# Patient Record
Sex: FEMALE | Race: WHITE | NOT HISPANIC OR LATINO | Employment: FULL TIME | URBAN - METROPOLITAN AREA
[De-identification: names, ages, dates, MRNs, and addresses within clinical notes are randomized per-mention and may not be internally consistent; named-entity substitution may affect disease eponyms.]

---

## 2018-01-04 ENCOUNTER — OFFICE VISIT (OUTPATIENT)
Dept: URGENT CARE | Facility: CLINIC | Age: 63
End: 2018-01-04
Payer: COMMERCIAL

## 2018-01-04 VITALS
RESPIRATION RATE: 16 BRPM | TEMPERATURE: 97 F | OXYGEN SATURATION: 97 % | SYSTOLIC BLOOD PRESSURE: 156 MMHG | BODY MASS INDEX: 25.86 KG/M2 | HEART RATE: 95 BPM | WEIGHT: 137 LBS | DIASTOLIC BLOOD PRESSURE: 79 MMHG | HEIGHT: 61 IN

## 2018-01-04 DIAGNOSIS — R05.9 COUGH: ICD-10-CM

## 2018-01-04 DIAGNOSIS — J06.9 URI, ACUTE: Primary | ICD-10-CM

## 2018-01-04 LAB
CTP QC/QA: YES
FLUAV AG NPH QL: NEGATIVE
FLUBV AG NPH QL: NEGATIVE

## 2018-01-04 PROCEDURE — 99203 OFFICE O/P NEW LOW 30 MIN: CPT | Mod: S$GLB,,, | Performed by: PHYSICIAN ASSISTANT

## 2018-01-04 PROCEDURE — 87804 INFLUENZA ASSAY W/OPTIC: CPT | Mod: QW,S$GLB,, | Performed by: PHYSICIAN ASSISTANT

## 2018-01-04 RX ORDER — FERROUS SULFATE, DRIED 160(50) MG
1 TABLET, EXTENDED RELEASE ORAL 2 TIMES DAILY WITH MEALS
COMMUNITY
End: 2019-02-14

## 2018-01-04 RX ORDER — EZETIMIBE 10 MG/1
10 TABLET ORAL DAILY
COMMUNITY
End: 2019-02-14

## 2018-01-04 RX ORDER — FELODIPINE 10 MG/1
10 TABLET, EXTENDED RELEASE ORAL DAILY
COMMUNITY
End: 2019-02-14

## 2018-01-04 RX ORDER — TRAMADOL HYDROCHLORIDE 50 MG/1
50 TABLET ORAL EVERY 6 HOURS PRN
COMMUNITY

## 2018-01-04 RX ORDER — FELBAMATE 400 MG/1
400 TABLET ORAL EVERY 6 HOURS
COMMUNITY
End: 2019-02-14

## 2018-01-04 RX ORDER — FEXOFENADINE HCL AND PSEUDOEPHEDRINE HCI 60; 120 MG/1; MG/1
1 TABLET, EXTENDED RELEASE ORAL 2 TIMES DAILY
Qty: 20 TABLET | Refills: 0 | Status: SHIPPED | OUTPATIENT
Start: 2018-01-04 | End: 2018-01-14

## 2018-01-04 RX ORDER — DIPHENOXYLATE HYDROCHLORIDE AND ATROPINE SULFATE 2.5; .025 MG/1; MG/1
1 TABLET ORAL 4 TIMES DAILY PRN
COMMUNITY
End: 2019-02-14

## 2018-01-04 RX ORDER — INSULIN GLARGINE 100 [IU]/ML
INJECTION, SOLUTION SUBCUTANEOUS NIGHTLY
COMMUNITY
End: 2019-02-14

## 2018-01-04 RX ORDER — GLIPIZIDE 10 MG/1
20 TABLET, FILM COATED, EXTENDED RELEASE ORAL
COMMUNITY

## 2018-01-04 RX ORDER — ONDANSETRON HYDROCHLORIDE 4 MG/5ML
8 SOLUTION ORAL 2 TIMES DAILY
COMMUNITY
End: 2019-02-14

## 2018-01-04 RX ORDER — PANTOPRAZOLE SODIUM 20 MG/1
20 TABLET, DELAYED RELEASE ORAL DAILY
COMMUNITY

## 2018-01-04 RX ORDER — GLUCOSAMINE/CHONDR SU A SOD 167-133 MG
2000 CAPSULE ORAL NIGHTLY
COMMUNITY
End: 2019-02-14

## 2018-01-05 NOTE — PROGRESS NOTES
"Subjective:       Patient ID: Karen Leone is a 62 y.o. female.    Vitals:  height is 5' 1" (1.549 m) and weight is 62.1 kg (137 lb). Her temperature is 96.7 °F (35.9 °C). Her blood pressure is 156/79 (abnormal) and her pulse is 95. Her respiration is 16 and oxygen saturation is 97%.     Chief Complaint: Cough    Pt presents with cough, congestion x 3 days. She reports no fevers or chills. She reports nausea on the 1st day of sxs, but that resolved. She has been in contact with others who have had the flu. MB      Cough   This is a new problem. Episode onset: 4d. The problem has been gradually worsening. The cough is non-productive. Associated symptoms include headaches, nasal congestion and a sore throat. Pertinent negatives include no chest pain, chills, ear pain, eye redness, fever, myalgias, shortness of breath or wheezing. Nothing aggravates the symptoms. She has tried OTC cough suppressant for the symptoms. The treatment provided mild relief.     Review of Systems   Constitution: Positive for decreased appetite. Negative for chills, fever and malaise/fatigue.   HENT: Positive for congestion and sore throat. Negative for ear pain and hoarse voice.    Eyes: Negative for discharge and redness.   Cardiovascular: Negative for chest pain, dyspnea on exertion and leg swelling.   Respiratory: Positive for cough. Negative for shortness of breath, sputum production and wheezing.    Musculoskeletal: Negative for myalgias.   Gastrointestinal: Positive for nausea. Negative for abdominal pain.   Neurological: Positive for headaches.       Objective:      Physical Exam   Constitutional: She is oriented to person, place, and time. She appears well-developed and well-nourished. She is cooperative.  Non-toxic appearance. She does not appear ill. No distress.   HENT:   Head: Normocephalic and atraumatic.   Right Ear: Hearing, tympanic membrane, external ear and ear canal normal.   Left Ear: Hearing, tympanic membrane, external " ear and ear canal normal.   Nose: Mucosal edema present. No rhinorrhea or nasal deformity. No epistaxis. Right sinus exhibits no maxillary sinus tenderness and no frontal sinus tenderness. Left sinus exhibits no maxillary sinus tenderness and no frontal sinus tenderness.   Mouth/Throat: Uvula is midline, oropharynx is clear and moist and mucous membranes are normal. No trismus in the jaw. Normal dentition. No uvula swelling. No posterior oropharyngeal erythema.   Eyes: Conjunctivae and lids are normal. Right eye exhibits no discharge. Left eye exhibits no discharge. No scleral icterus.   Sclera clear bilat   Neck: Trachea normal, normal range of motion, full passive range of motion without pain and phonation normal. Neck supple.   Cardiovascular: Normal rate, regular rhythm, normal heart sounds, intact distal pulses and normal pulses.    Pulmonary/Chest: Effort normal and breath sounds normal. No respiratory distress.   Abdominal: Soft. Normal appearance and bowel sounds are normal. She exhibits no distension, no pulsatile midline mass and no mass. There is no tenderness.   Musculoskeletal: Normal range of motion. She exhibits no edema or deformity.   Neurological: She is alert and oriented to person, place, and time. She exhibits normal muscle tone. Coordination normal.   Skin: Skin is warm, dry and intact. She is not diaphoretic. No pallor.   Psychiatric: She has a normal mood and affect. Her speech is normal and behavior is normal. Judgment and thought content normal. Cognition and memory are normal.   Nursing note and vitals reviewed.      Assessment:       1. URI, acute    2. Cough        Plan:         URI, acute  -     benzocaine-menthol 15-2.6 mg Lozg; 1 lozenge by Mucous Membrane route as needed (sore throat).  Dispense: 18 lozenge; Refill: 0  -     fexofenadine-pseudoephedrine  mg (ALLEGRA-D 12 HOUR)  mg per tablet; Take 1 tablet by mouth 2 (two) times daily.  Dispense: 20 tablet; Refill:  0    Cough  -     POCT Influenza A/B    Please drink plenty of fluids.  Please get plenty of rest.  Please return here or go to the Emergency Department for any concerns or worsening of condition.  If you were prescribed antibiotics, please take them to completion.  If you were given wait & see antibiotics, please wait 5-7 days before taking them, and only take them if your symptoms have worsened or not improved.  If you do begin taking the antibiotics, please take them to completion.  If you were prescribed a narcotic medication, do not drive or operate heavy equipment or machinery while taking these medications.  If you were given a steroid shot in the clinic and have also been given a prescription for a steroid such as Prednisone or a Medrol Dose Pack, please begin taking them tomorrow.  If you do not have Hypertension or any history of palpitations, it is ok to take over the counter Sudafed or Mucinex D or Allegra-D or Claritin-D or Zyrtec-D.  If you do take one of the above, it is ok to combine that with plain over the counter Mucinex or Allegra or Claritin or Zyrtec.  If for example you are taking Zyrtec -D, you can combine that with Mucinex, but not Mucinex-D.  If you are taking Mucinex-D, you can combine that with plain Allegra or Claritin or Zyrtec.   If you do have Hypertension or palpitations, it is safe to take Coricidin HBP for relief of sinus symptoms.  If not allergic, please take over the counter Tylenol (Acetaminophen) and/or Motrin (Ibuprofen) as directed for control of pain and/or fever.  Please follow up with your primary care doctor or specialist as needed.    If you  smoke, please stop smoking.

## 2019-02-14 ENCOUNTER — HOSPITAL ENCOUNTER (OUTPATIENT)
Facility: HOSPITAL | Age: 64
Discharge: HOME | End: 2019-02-15
Attending: EMERGENCY MEDICINE | Admitting: INTERNAL MEDICINE
Payer: MEDICARE
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
## 2019-02-14 DIAGNOSIS — R55 NEAR SYNCOPE: ICD-10-CM

## 2019-02-14 DIAGNOSIS — R53.1 WEAKNESS: Primary | ICD-10-CM

## 2019-02-14 DIAGNOSIS — I16.0 HYPERTENSIVE URGENCY: ICD-10-CM

## 2019-02-14 DIAGNOSIS — R55 PRE-SYNCOPE: ICD-10-CM

## 2019-02-14 DIAGNOSIS — R79.89 ELEVATED TROPONIN: ICD-10-CM

## 2019-02-14 PROBLEM — I10 ESSENTIAL HYPERTENSION: Status: ACTIVE | Noted: 2019-02-14

## 2019-02-14 PROBLEM — E03.9 HYPOTHYROID: Status: ACTIVE | Noted: 2019-02-14

## 2019-02-14 PROBLEM — Z79.4 TYPE 2 DIABETES MELLITUS WITHOUT COMPLICATION, WITH LONG-TERM CURRENT USE OF INSULIN: Status: ACTIVE | Noted: 2019-02-14

## 2019-02-14 PROBLEM — E78.5 HLD (HYPERLIPIDEMIA): Status: ACTIVE | Noted: 2019-02-14

## 2019-02-14 PROBLEM — E11.9 TYPE 2 DIABETES MELLITUS WITHOUT COMPLICATION, WITH LONG-TERM CURRENT USE OF INSULIN: Status: ACTIVE | Noted: 2019-02-14

## 2019-02-14 LAB
25(OH)D3+25(OH)D2 SERPL-MCNC: 38 NG/ML
ALBUMIN SERPL BCP-MCNC: 3.4 G/DL
ALP SERPL-CCNC: 53 U/L
ALT SERPL W/O P-5'-P-CCNC: 13 U/L
AMPHET+METHAMPHET UR QL: NEGATIVE
ANION GAP SERPL CALC-SCNC: 15 MMOL/L
AORTIC ROOT ANNULUS: 2.37 CM
AORTIC VALVE CUSP SEPERATION: 1.37 CM
AST SERPL-CCNC: 24 U/L
AV INDEX (PROSTH): 0.97
AV MEAN GRADIENT: 1.98 MMHG
AV PEAK GRADIENT: 4 MMHG
AV VALVE AREA: 2.51 CM2
AV VELOCITY RATIO: 1.03
BACTERIA #/AREA URNS HPF: ABNORMAL /HPF
BARBITURATES UR QL SCN>200 NG/ML: NEGATIVE
BASOPHILS # BLD AUTO: 0.02 K/UL
BASOPHILS NFR BLD: 0.2 %
BENZODIAZ UR QL SCN>200 NG/ML: NEGATIVE
BILIRUB SERPL-MCNC: 0.4 MG/DL
BILIRUB UR QL STRIP: NEGATIVE
BNP SERPL-MCNC: 150 PG/ML
BSA FOR ECHO PROCEDURE: 1.56 M2
BUN SERPL-MCNC: 32 MG/DL
BZE UR QL SCN: NEGATIVE
CALCIUM SERPL-MCNC: 8.4 MG/DL
CANNABINOIDS UR QL SCN: NEGATIVE
CHLORIDE SERPL-SCNC: 98 MMOL/L
CHOLEST SERPL-MCNC: 140 MG/DL
CHOLEST/HDLC SERPL: 2.3 {RATIO}
CK SERPL-CCNC: 138 U/L
CLARITY UR: CLEAR
CO2 SERPL-SCNC: 26 MMOL/L
COLOR UR: YELLOW
CREAT SERPL-MCNC: 1.3 MG/DL
CREAT UR-MCNC: 69.3 MG/DL
CV ECHO LV RWT: 0.39 CM
DIFFERENTIAL METHOD: ABNORMAL
DOP CALC AO PEAK VEL: 1 M/S
DOP CALC AO VTI: 22.97 CM
DOP CALC LVOT AREA: 2.6 CM2
DOP CALC LVOT DIAMETER: 1.82 CM
DOP CALC LVOT PEAK VEL: 1.03 M/S
DOP CALC LVOT STROKE VOLUME: 57.73 CM3
DOP CALCLVOT PEAK VEL VTI: 22.2 CM
E WAVE DECELERATION TIME: 168.97 MSEC
E/A RATIO: 1.15
ECHO LV POSTERIOR WALL: 0.77 CM (ref 0.6–1.1)
EOSINOPHIL # BLD AUTO: 0.1 K/UL
EOSINOPHIL NFR BLD: 0.6 %
ERYTHROCYTE [DISTWIDTH] IN BLOOD BY AUTOMATED COUNT: 14.4 %
EST. GFR  (AFRICAN AMERICAN): 50 ML/MIN/1.73 M^2
EST. GFR  (NON AFRICAN AMERICAN): 43 ML/MIN/1.73 M^2
ESTIMATED AVG GLUCOSE: 163 MG/DL
FRACTIONAL SHORTENING: 37 % (ref 28–44)
GLUCOSE SERPL-MCNC: 81 MG/DL
GLUCOSE UR QL STRIP: NEGATIVE
HBA1C MFR BLD HPLC: 7.3 %
HCT VFR BLD AUTO: 37.9 %
HDLC SERPL-MCNC: 61 MG/DL
HDLC SERPL: 43.6 %
HGB BLD-MCNC: 12.1 G/DL
HGB UR QL STRIP: NEGATIVE
INTERVENTRICULAR SEPTUM: 1.05 CM (ref 0.6–1.1)
IVRT: 0.09 MSEC
KETONES UR QL STRIP: NEGATIVE
LA MAJOR: 4.47 CM
LA MINOR: 4.39 CM
LA WIDTH: 3.01 CM
LACTATE SERPL-SCNC: 1.7 MMOL/L
LDLC SERPL CALC-MCNC: 49.8 MG/DL
LEFT ATRIUM SIZE: 3.49 CM
LEFT ATRIUM VOLUME INDEX: 25.7 ML/M2
LEFT ATRIUM VOLUME: 39.55 CM3
LEFT INTERNAL DIMENSION IN SYSTOLE: 2.52 CM (ref 2.1–4)
LEFT VENTRICLE DIASTOLIC VOLUME INDEX: 45.32 ML/M2
LEFT VENTRICLE DIASTOLIC VOLUME: 69.75 ML
LEFT VENTRICLE MASS INDEX: 72.1 G/M2
LEFT VENTRICLE SYSTOLIC VOLUME INDEX: 14.7 ML/M2
LEFT VENTRICLE SYSTOLIC VOLUME: 22.66 ML
LEFT VENTRICULAR INTERNAL DIMENSION IN DIASTOLE: 3.99 CM (ref 3.5–6)
LEFT VENTRICULAR MASS: 110.92 G
LEUKOCYTE ESTERASE UR QL STRIP: NEGATIVE
LV LATERAL E/E' RATIO: 13
LYMPHOCYTES # BLD AUTO: 1.9 K/UL
LYMPHOCYTES NFR BLD: 23.5 %
MCH RBC QN AUTO: 27.8 PG
MCHC RBC AUTO-ENTMCNC: 31.9 G/DL
MCV RBC AUTO: 87 FL
METHADONE UR QL SCN>300 NG/ML: NEGATIVE
MICROSCOPIC COMMENT: ABNORMAL
MONOCYTES # BLD AUTO: 0.8 K/UL
MONOCYTES NFR BLD: 9.3 %
MV PEAK A VEL: 0.79 M/S
MV PEAK E VEL: 0.91 M/S
NEUTROPHILS # BLD AUTO: 5.4 K/UL
NEUTROPHILS NFR BLD: 66.2 %
NITRITE UR QL STRIP: POSITIVE
NONHDLC SERPL-MCNC: 79 MG/DL
OPIATES UR QL SCN: NEGATIVE
PCP UR QL SCN>25 NG/ML: NEGATIVE
PH UR STRIP: 6 [PH] (ref 5–8)
PLATELET # BLD AUTO: 310 K/UL
PMV BLD AUTO: 9.8 FL
POCT GLUCOSE: 142 MG/DL (ref 70–110)
POTASSIUM SERPL-SCNC: 3.3 MMOL/L
PROT SERPL-MCNC: 7 G/DL
PROT UR QL STRIP: NEGATIVE
PULM VEIN S/D RATIO: 1.18
PV PEAK D VEL: 0.49 M/S
PV PEAK S VEL: 0.58 M/S
PV PEAK VELOCITY: 0.81 CM/S
RA MAJOR: 4.03 CM
RA PRESSURE: 3 MMHG
RBC # BLD AUTO: 4.36 M/UL
RBC #/AREA URNS HPF: 1 /HPF (ref 0–4)
RIGHT VENTRICULAR END-DIASTOLIC DIMENSION: 2.34 CM
SODIUM SERPL-SCNC: 139 MMOL/L
SP GR UR STRIP: 1.01 (ref 1–1.03)
T4 FREE SERPL-MCNC: 1.33 NG/DL
TDI LATERAL: 0.07
TOXICOLOGY INFORMATION: NORMAL
TRIGL SERPL-MCNC: 146 MG/DL
TROPONIN I SERPL DL<=0.01 NG/ML-MCNC: 0.05 NG/ML
TROPONIN I SERPL DL<=0.01 NG/ML-MCNC: 0.09 NG/ML
URN SPEC COLLECT METH UR: ABNORMAL
UROBILINOGEN UR STRIP-ACNC: NEGATIVE EU/DL
WBC # BLD AUTO: 8.18 K/UL
WBC #/AREA URNS HPF: 3 /HPF (ref 0–5)

## 2019-02-14 PROCEDURE — 84484 ASSAY OF TROPONIN QUANT: CPT | Mod: 91

## 2019-02-14 PROCEDURE — 94761 N-INVAS EAR/PLS OXIMETRY MLT: CPT

## 2019-02-14 PROCEDURE — 63600175 PHARM REV CODE 636 W HCPCS: Performed by: STUDENT IN AN ORGANIZED HEALTH CARE EDUCATION/TRAINING PROGRAM

## 2019-02-14 PROCEDURE — 96372 THER/PROPH/DIAG INJ SC/IM: CPT | Mod: 59

## 2019-02-14 PROCEDURE — 80061 LIPID PANEL: CPT

## 2019-02-14 PROCEDURE — 93010 ELECTROCARDIOGRAM REPORT: CPT | Mod: 76,,, | Performed by: STUDENT IN AN ORGANIZED HEALTH CARE EDUCATION/TRAINING PROGRAM

## 2019-02-14 PROCEDURE — G0378 HOSPITAL OBSERVATION PER HR: HCPCS

## 2019-02-14 PROCEDURE — 80053 COMPREHEN METABOLIC PANEL: CPT

## 2019-02-14 PROCEDURE — 85025 COMPLETE CBC W/AUTO DIFF WBC: CPT

## 2019-02-14 PROCEDURE — 83880 ASSAY OF NATRIURETIC PEPTIDE: CPT

## 2019-02-14 PROCEDURE — 25000003 PHARM REV CODE 250: Performed by: STUDENT IN AN ORGANIZED HEALTH CARE EDUCATION/TRAINING PROGRAM

## 2019-02-14 PROCEDURE — 83605 ASSAY OF LACTIC ACID: CPT

## 2019-02-14 PROCEDURE — 82306 VITAMIN D 25 HYDROXY: CPT

## 2019-02-14 PROCEDURE — 84439 ASSAY OF FREE THYROXINE: CPT

## 2019-02-14 PROCEDURE — 93010 EKG 12-LEAD: ICD-10-PCS | Mod: ,,, | Performed by: STUDENT IN AN ORGANIZED HEALTH CARE EDUCATION/TRAINING PROGRAM

## 2019-02-14 PROCEDURE — 93010 ELECTROCARDIOGRAM REPORT: CPT | Mod: ,,, | Performed by: STUDENT IN AN ORGANIZED HEALTH CARE EDUCATION/TRAINING PROGRAM

## 2019-02-14 PROCEDURE — 93005 ELECTROCARDIOGRAM TRACING: CPT

## 2019-02-14 PROCEDURE — 81000 URINALYSIS NONAUTO W/SCOPE: CPT | Mod: 59

## 2019-02-14 PROCEDURE — 25500020 PHARM REV CODE 255: Performed by: INTERNAL MEDICINE

## 2019-02-14 PROCEDURE — 84484 ASSAY OF TROPONIN QUANT: CPT

## 2019-02-14 PROCEDURE — S5571 INSULIN DISPOS PEN 3 ML: HCPCS | Performed by: STUDENT IN AN ORGANIZED HEALTH CARE EDUCATION/TRAINING PROGRAM

## 2019-02-14 PROCEDURE — 83036 HEMOGLOBIN GLYCOSYLATED A1C: CPT

## 2019-02-14 PROCEDURE — 80307 DRUG TEST PRSMV CHEM ANLYZR: CPT

## 2019-02-14 PROCEDURE — 82550 ASSAY OF CK (CPK): CPT

## 2019-02-14 PROCEDURE — 36415 COLL VENOUS BLD VENIPUNCTURE: CPT

## 2019-02-14 PROCEDURE — 99285 EMERGENCY DEPT VISIT HI MDM: CPT | Mod: 25

## 2019-02-14 RX ORDER — FENOFIBRATE 160 MG/1
160 TABLET ORAL DAILY
COMMUNITY

## 2019-02-14 RX ORDER — IBUPROFEN 200 MG
16 TABLET ORAL
Status: DISCONTINUED | OUTPATIENT
Start: 2019-02-14 | End: 2019-02-15 | Stop reason: HOSPADM

## 2019-02-14 RX ORDER — CETIRIZINE HYDROCHLORIDE 5 MG/1
5 TABLET ORAL DAILY
Status: DISCONTINUED | OUTPATIENT
Start: 2019-02-15 | End: 2019-02-15 | Stop reason: HOSPADM

## 2019-02-14 RX ORDER — PANTOPRAZOLE SODIUM 20 MG/1
20 TABLET, DELAYED RELEASE ORAL DAILY
Status: DISCONTINUED | OUTPATIENT
Start: 2019-02-15 | End: 2019-02-15 | Stop reason: HOSPADM

## 2019-02-14 RX ORDER — HYDROCHLOROTHIAZIDE 25 MG/1
25 TABLET ORAL ONCE
Status: COMPLETED | OUTPATIENT
Start: 2019-02-14 | End: 2019-02-14

## 2019-02-14 RX ORDER — IBUPROFEN 200 MG
24 TABLET ORAL
Status: DISCONTINUED | OUTPATIENT
Start: 2019-02-14 | End: 2019-02-15 | Stop reason: HOSPADM

## 2019-02-14 RX ORDER — FENOFIBRATE 160 MG/1
160 TABLET ORAL DAILY
Status: DISCONTINUED | OUTPATIENT
Start: 2019-02-15 | End: 2019-02-15 | Stop reason: HOSPADM

## 2019-02-14 RX ORDER — POTASSIUM CHLORIDE 20 MEQ/1
40 TABLET, EXTENDED RELEASE ORAL ONCE
Status: COMPLETED | OUTPATIENT
Start: 2019-02-14 | End: 2019-02-14

## 2019-02-14 RX ORDER — LISINOPRIL 20 MG/1
40 TABLET ORAL DAILY
Status: DISCONTINUED | OUTPATIENT
Start: 2019-02-15 | End: 2019-02-15 | Stop reason: HOSPADM

## 2019-02-14 RX ORDER — SODIUM CHLORIDE 0.9 % (FLUSH) 0.9 %
5 SYRINGE (ML) INJECTION
Status: DISCONTINUED | OUTPATIENT
Start: 2019-02-14 | End: 2019-02-15 | Stop reason: HOSPADM

## 2019-02-14 RX ORDER — ERGOCALCIFEROL 1.25 MG/1
50000 CAPSULE ORAL
COMMUNITY

## 2019-02-14 RX ORDER — INSULIN ASPART 100 [IU]/ML
1-10 INJECTION, SOLUTION INTRAVENOUS; SUBCUTANEOUS
Status: DISCONTINUED | OUTPATIENT
Start: 2019-02-14 | End: 2019-02-15 | Stop reason: HOSPADM

## 2019-02-14 RX ORDER — LISINOPRIL 40 MG/1
60 TABLET ORAL DAILY
Status: ON HOLD | COMMUNITY
End: 2019-02-15 | Stop reason: SDUPTHER

## 2019-02-14 RX ORDER — GLUCAGON 1 MG
1 KIT INJECTION
Status: DISCONTINUED | OUTPATIENT
Start: 2019-02-14 | End: 2019-02-15 | Stop reason: HOSPADM

## 2019-02-14 RX ORDER — LORATADINE 10 MG/1
10 TABLET ORAL DAILY
COMMUNITY

## 2019-02-14 RX ORDER — DIPHENOXYLATE HYDROCHLORIDE AND ATROPINE SULFATE 2.5; .025 MG/1; MG/1
1 TABLET ORAL 3 TIMES DAILY PRN
Status: DISCONTINUED | OUTPATIENT
Start: 2019-02-14 | End: 2019-02-15 | Stop reason: HOSPADM

## 2019-02-14 RX ORDER — LEVOTHYROXINE SODIUM 50 UG/1
50 TABLET ORAL
Status: DISCONTINUED | OUTPATIENT
Start: 2019-02-15 | End: 2019-02-15 | Stop reason: HOSPADM

## 2019-02-14 RX ORDER — AMLODIPINE BESYLATE 5 MG/1
5 TABLET ORAL ONCE
Status: COMPLETED | OUTPATIENT
Start: 2019-02-14 | End: 2019-02-14

## 2019-02-14 RX ORDER — LEVOTHYROXINE SODIUM 50 UG/1
50 TABLET ORAL DAILY
COMMUNITY

## 2019-02-14 RX ORDER — ATORVASTATIN CALCIUM 40 MG/1
80 TABLET, FILM COATED ORAL NIGHTLY
Status: DISCONTINUED | OUTPATIENT
Start: 2019-02-14 | End: 2019-02-15 | Stop reason: HOSPADM

## 2019-02-14 RX ORDER — DIPHENOXYLATE HYDROCHLORIDE AND ATROPINE SULFATE 2.5; .025 MG/1; MG/1
1 TABLET ORAL 3 TIMES DAILY
Status: DISCONTINUED | OUTPATIENT
Start: 2019-02-14 | End: 2019-02-14

## 2019-02-14 RX ORDER — AMLODIPINE BESYLATE 5 MG/1
10 TABLET ORAL DAILY
Status: DISCONTINUED | OUTPATIENT
Start: 2019-02-15 | End: 2019-02-15 | Stop reason: HOSPADM

## 2019-02-14 RX ORDER — METOPROLOL SUCCINATE 50 MG/1
100 TABLET, EXTENDED RELEASE ORAL DAILY
COMMUNITY

## 2019-02-14 RX ORDER — AMLODIPINE BESYLATE 5 MG/1
10 TABLET ORAL DAILY
COMMUNITY

## 2019-02-14 RX ORDER — INSULIN GLARGINE 100 [IU]/ML
28 INJECTION, SOLUTION SUBCUTANEOUS NIGHTLY
COMMUNITY

## 2019-02-14 RX ORDER — ATORVASTATIN CALCIUM 80 MG/1
80 TABLET, FILM COATED ORAL DAILY
COMMUNITY

## 2019-02-14 RX ORDER — METOPROLOL SUCCINATE 50 MG/1
50 TABLET, EXTENDED RELEASE ORAL DAILY
Status: DISCONTINUED | OUTPATIENT
Start: 2019-02-15 | End: 2019-02-15 | Stop reason: HOSPADM

## 2019-02-14 RX ORDER — LEVOTHYROXINE SODIUM 50 UG/1
50 TABLET ORAL DAILY
Status: DISCONTINUED | OUTPATIENT
Start: 2019-02-14 | End: 2019-02-14

## 2019-02-14 RX ORDER — DIPHENOXYLATE HYDROCHLORIDE AND ATROPINE SULFATE 2.5; .025 MG/1; MG/1
1 TABLET ORAL 3 TIMES DAILY
COMMUNITY

## 2019-02-14 RX ORDER — ACETAMINOPHEN 325 MG/1
650 TABLET ORAL EVERY 4 HOURS PRN
Status: DISCONTINUED | OUTPATIENT
Start: 2019-02-14 | End: 2019-02-15 | Stop reason: HOSPADM

## 2019-02-14 RX ORDER — HEPARIN SODIUM 5000 [USP'U]/ML
5000 INJECTION, SOLUTION INTRAVENOUS; SUBCUTANEOUS EVERY 12 HOURS
Status: DISCONTINUED | OUTPATIENT
Start: 2019-02-14 | End: 2019-02-15 | Stop reason: HOSPADM

## 2019-02-14 RX ORDER — TRAMADOL HYDROCHLORIDE 50 MG/1
50 TABLET ORAL EVERY 6 HOURS PRN
Status: DISCONTINUED | OUTPATIENT
Start: 2019-02-14 | End: 2019-02-15 | Stop reason: HOSPADM

## 2019-02-14 RX ORDER — HYDROCHLOROTHIAZIDE 25 MG/1
25 TABLET ORAL DAILY
Status: DISCONTINUED | OUTPATIENT
Start: 2019-02-15 | End: 2019-02-15 | Stop reason: HOSPADM

## 2019-02-14 RX ADMIN — INSULIN DETEMIR 20 UNITS: 100 INJECTION, SOLUTION SUBCUTANEOUS at 09:02

## 2019-02-14 RX ADMIN — AMLODIPINE BESYLATE 5 MG: 5 TABLET ORAL at 03:02

## 2019-02-14 RX ADMIN — POTASSIUM CHLORIDE 40 MEQ: 20 TABLET, EXTENDED RELEASE ORAL at 03:02

## 2019-02-14 RX ADMIN — HYDROCHLOROTHIAZIDE 25 MG: 25 TABLET ORAL at 07:02

## 2019-02-14 RX ADMIN — ATORVASTATIN CALCIUM 80 MG: 40 TABLET, FILM COATED ORAL at 08:02

## 2019-02-14 RX ADMIN — TRAMADOL HYDROCHLORIDE 50 MG: 50 TABLET, FILM COATED ORAL at 08:02

## 2019-02-14 RX ADMIN — HEPARIN SODIUM 5000 UNITS: 5000 INJECTION, SOLUTION INTRAVENOUS; SUBCUTANEOUS at 08:02

## 2019-02-14 RX ADMIN — IOHEXOL 100 ML: 350 INJECTION, SOLUTION INTRAVENOUS at 04:02

## 2019-02-14 NOTE — H&P
"[facility] Medicine H&P Note     Admitting Team: [facility]   Attending Physician: Sal Kilpatrick MD   Resident: Ricarda/Rick  Intern: Anna    Date of Admit: 2/14/2019    Chief Complaint     Fall (Pt reports just before falling her body muscles in her legs and shoulds began to twitch involuntarily causing her to lose control of her legs and fell hitting chin and R ribs and R knee. Currently twitching has resolved. )  x1    Subjective:      History of Present Illness:  Karen Leone is a 63 y.o. female who  has a past medical history of Diabetes mellitus, type 2, Diverticulosis, Hyperlipidemia, and Hypertension.. The patient presented to [facility] on 2/14/2019 with a primary complaint of Fall (Pt reports just before falling her body muscles in her legs and shoulds began to twitch involuntarily causing her to lose control of her legs and fell hitting chin and R ribs and R knee. Currently twitching has resolved. 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 She was in her usual state of health - independent of all ADLs - until the morning of admit. She was cleaning up the OR after a surgery and had the sudden onset of "twitching" and shaking in both of her legs. She felt as though she was frozen, could not move or lift her legs. This lasted for a few seconds until she fell, hitting her chin on the surgical tray and her right rib and knee on the floor. She denies loss of consciousness or confusion. Just prior to this morning's surgery, patient said she had similar feeling in both of her arms that self-resolved within seconds. The patient has never experienced anything like this before today. Denies facial droop, slurred speech, other weakness, fever, chills, chest pain, SOB or palpitations.    In the ED, patient's BP noted to be 220/98. She says she had not taken any of her medications for the day, was planning to after the " procedure. She took them on the way to the ED.     Past Medical History:  HTN  HLD  DM2  Diverticulosis    Past Surgical History:  Tonsillectomy  Eye procedure, bilaterally  L knee tendon repair    Allergies:  Keflex - hives, denies angioedema     Home Medications:  Lisinopril 60 (increased from 40mg 1 month ago)  HCTZ 25  Norvasc 5  Metoprolol XL 50  Basaglar 28U qHS   Glipzide ER 20  Protonix 40  Acyclovir 200mg x5 days qprn  Tramadol 50mg q6h prn   Lamotil 1 tab tid  ofran 4mg bid   Clacium 500mg   Vit D 50K units qweek  Lipitor 80 qHS   Synthroid 50   Fenofibrate 150   Claritin 10mg     Family History:  Mother - diabetes    Social History:  Tobacco - former, 20 pack-year history   EtOH - current, 3 cocktails/night  Drugs - denies    Review of Systems:  Pertinent positives as noted in HPI. All other systems are reviewed and are negative.    Health Maintaince :   Primary Care Physician: Dr. Gonzalez   Immunizations:   TDap: UTD  Flu: UTD  Pna: UTD     Cancer Screening:  PAP: refuses   MMG: not up to date, last   Colonoscopy: not up to date, scheduled for 3/2019      Objective:   Last 24 Hour Vital Signs:  BP  Min: 220/98  Max: 220/98  Temp  Av.2 °F (36.8 °C)  Min: 98.2 °F (36.8 °C)  Max: 98.2 °F (36.8 °C)  Pulse  Av  Min: 73  Max: 73  Resp  Av  Min: 16  Max: 16  SpO2  Av %  Min: 97 %  Max: 97 %  Height  Av' (152.4 cm)  Min: 5' (152.4 cm)  Max: 5' (152.4 cm)  Weight  Av.6 kg (127 lb)  Min: 57.6 kg (127 lb)  Max: 57.6 kg (127 lb)  Body mass index is 24.8 kg/m².  No intake/output data recorded.    Physical Examination:  Physical Exam   Constitutional: She is oriented to person, place, and time and well-developed, well-nourished, and in no distress.   HENT:   Head: Normocephalic and atraumatic.       Nose: Nose normal.   Mouth/Throat: Oropharynx is clear and moist. No oropharyngeal exudate.   Eyes: EOM are normal. Pupils are equal, round, and reactive to light. No scleral icterus.    Neck: Normal range of motion. Neck supple. No JVD present. No thyromegaly present.   Cardiovascular: Normal rate, regular rhythm, normal heart sounds and intact distal pulses. Exam reveals no gallop and no friction rub.   No murmur heard.  Pulmonary/Chest: Effort normal and breath sounds normal. No respiratory distress. She has no wheezes. She has no rales. She exhibits no tenderness.   Abdominal: Soft. Bowel sounds are normal. She exhibits no distension and no mass. There is no tenderness. There is no rebound and no guarding.   Musculoskeletal: Normal range of motion. She exhibits no edema, tenderness or deformity.   Lymphadenopathy:     She has no cervical adenopathy.   Neurological: She is alert and oriented to person, place, and time. She is not disoriented. She displays no weakness, no tremor, facial symmetry and normal speech. No cranial nerve deficit or sensory deficit. She shows no pronator drift. GCS score is 15.   Skin: Skin is warm and dry. No rash noted. She is not diaphoretic. No erythema.       Laboratory:  Most Recent Data:  CBC:   Lab Results   Component Value Date    WBC 8.18 02/14/2019    HGB 12.1 02/14/2019    HCT 37.9 02/14/2019     02/14/2019    MCV 87 02/14/2019    RDW 14.4 02/14/2019   WBC Differential: 66.2% N, 23.5% L, 9.3% M, 0.6% Eo, 0.2% Baso    BMP:   Lab Results   Component Value Date     02/14/2019    K 3.3 (L) 02/14/2019    CL 98 02/14/2019    CO2 26 02/14/2019    BUN 32 (H) 02/14/2019    CREATININE 1.3 02/14/2019    GLU 81 02/14/2019    CALCIUM 8.4 (L) 02/14/2019     LFTs:   Lab Results   Component Value Date    PROT 7.0 02/14/2019    ALBUMIN 3.4 (L) 02/14/2019    BILITOT 0.4 02/14/2019    AST 24 02/14/2019    ALKPHOS 53 (L) 02/14/2019    ALT 13 02/14/2019     Coags: No results found for: INR, PROTIME, PTT  FLP: No results found for: CHOL, HDL, LDLCALC, TRIG, CHOLHDL  DM:   Lab Results   Component Value Date    CREATININE 1.3 02/14/2019     Thyroid: No results found  for: TSH, FREET4, S5ABZCR, C5ZEXJA, THYROIDAB  Anemia: No results found for: IRON, TIBC, FERRITIN, YYECMFFB21, FOLATE  Cardiac:   Lab Results   Component Value Date    TROPONINI 0.051 (H) 02/14/2019     (H) 02/14/2019     Urinalysis:   Lab Results   Component Value Date    COLORU Yellow 02/14/2019    SPECGRAV 1.015 02/14/2019    NITRITE Positive (A) 02/14/2019    KETONESU Negative 02/14/2019    UROBILINOGEN Negative 02/14/2019    WBCUA 3 02/14/2019       Trended Lab Data:  Recent Labs   Lab 02/14/19  1144   WBC 8.18   HGB 12.1   HCT 37.9      MCV 87   RDW 14.4      K 3.3*   CL 98   CO2 26   BUN 32*   CREATININE 1.3   GLU 81   PROT 7.0   ALBUMIN 3.4*   BILITOT 0.4   AST 24   ALKPHOS 53*   ALT 13       Trended Cardiac Data:  Recent Labs   Lab 02/14/19  1144   TROPONINI 0.051*   *       Microbiology Data:  None drawn     Other Results:  EKG (my interpretation):     Radiology:  Imaging Results          CT Maxillofacial Without Contrast (Final result)  Result time 02/14/19 12:43:14    Final result by Rajan Cedeno MD (02/14/19 12:43:14)                 Impression:      Mild subcutaneous edema and probable small hematoma overlying the right anterior mandible.  No facial fractures.      Electronically signed by: Rajan Cedeno MD  Date:    02/14/2019  Time:    12:43             Narrative:    EXAMINATION:  CT MAXILLOFACIAL WITHOUT CONTRAST    CLINICAL HISTORY:  Facial fracture(s);    TECHNIQUE:  1.25 mm axial images were obtained through the maxillofacial region without the use of contrast.  Coronal and sagittal reformats were performed.    COMPARISON:  CT 02/14/2019.    FINDINGS:  No fractures.  No osseous destruction.  There is trace mucosal membrane thickening within the right ethmoid air cells.  Remaining paranasal sinuses and mastoids are clear.  Mandibular condyles are intact.  Degenerative changes of the cervical spine identified, most pronounced at C5-C6.  There is mild subcutaneous  edema with probable small hematoma overlying the right anterior mandible.                               X-Ray Knee 3 View Right (Final result)  Result time 02/14/19 11:32:21    Final result by Fede Becker MD (02/14/19 11:32:21)                 Impression:      No fracture, dislocation or joint effusion.      Electronically signed by: Fede Becker MD  Date:    02/14/2019  Time:    11:32             Narrative:    EXAMINATION:  XR KNEE 3 VIEW RIGHT    CLINICAL HISTORY:  Unspecified fall, initial encounter    TECHNIQUE:  AP, lateral, and Merchant views of the right knee were performed.    COMPARISON:  None    FINDINGS:  Hypertrophic change superior anterior patellar insertion site of extensor tendon.  Early DJD changes intercondylar and spinous process region tibial plateau and lateral patellofemoral joint.                               CT Head Without Contrast (Final result)  Result time 02/14/19 11:06:18    Final result by Rajan Cedeno MD (02/14/19 11:06:18)                 Impression:      1. No acute intracranial CT abnormalities.      Electronically signed by: Rajan Cedeno MD  Date:    02/14/2019  Time:    11:06             Narrative:    EXAMINATION:  CT HEAD WITHOUT CONTRAST    CLINICAL HISTORY:  Confusion/delirium, altered LOC, unexplained;    TECHNIQUE:  5-mm axial images were obtained through the head without the use of contrast.  Coronal and sagittal reformats were performed.    COMPARISON:  None.    FINDINGS:  No CT findings to suggest an acute major vascular distribution infarct.  No intra or extra-axial hemorrhage.  No midline shift or mass effect.  No hydrocephalus.  Sellar region is unremarkable.    There is trace mucosal membrane thickening within the right ethmoid air cells.  Remaining paranasal sinuses and mastoid air cells are clear.  No acute osseous abnormalities.  Subcutaneous soft tissues are normal.                               X-Ray Chest AP Portable (Final result)   "Result time 02/14/19 10:50:16    Final result by Antoni Cheng MD (02/14/19 10:50:16)                 Impression:      No acute abnormality.      Electronically signed by: Antoni Cheng MD  Date:    02/14/2019  Time:    10:50             Narrative:    EXAMINATION:  XR CHEST AP PORTABLE    CLINICAL HISTORY:  weakness;    TECHNIQUE:  Single frontal view of the chest was performed.    COMPARISON:  None    FINDINGS:  The lungs are clear, with normal appearance of pulmonary vasculature and no pleural effusion or pneumothorax.    The cardiac silhouette is normal in size. The hilar and mediastinal contours are unremarkable.    Bones are intact.    High-density material overlying the left upper quadrant of the abdomen, likely external to the patient.                                Assessment:     Karen Leone is a 63 y.o. female who  has a past medical history of Diabetes mellitus, type 2, Diverticulosis, Hyperlipidemia, and Hypertension.. The patient presented to [facility] on 2/14/2019 with a primary complaint of fall x1.      Plan:     "Trembling" with fall, unknown origin   - pt reports sudden onset "trembling" and locking up of bilateral LEs with mechanical fall. Contusions to right mandible, ribs and knee. Denies LOC.   - denies palpitations, h/o arrhythmia   - SIRS 0/3, no leukocytosis    - ED /98  Trp 0.051    lactate wnl   - CT maxillofacial mild SQ edema and hematoma overlying right anterior mandible, no fracture  XR knee no fracture, dislocation or effusion  CT head without acute intracranial abnormalities  CXR no acute abnormality   - will check CK, TSH/T4, vit B12   - will trend troponin and EKG, monitor on tele   - order TTE, will follow up   - consulted Cardiology, appreciate recs   - consulted Neurology, will order MRI, CTA head neck and EEG   - possible stress test tomorrow, NPO midnight     Abnormal UA   - positive for bacteria and nitrite   - no " leukocytosis, afebrile since admit   - continue to monitor     HTN  - BP in /98, pt took medications after fall on the way to emergency room  - change ACEI to 40, give norvasc  5 (took 5mg earlier)  - start norvasc 10, lisinopril 40, metoprolol 50    HLD  - start home fenofibrate 160 and lipitor   - will recheck lipid panel     Hypothyroidism  - start home synthroid  - will recheck TSH and T4     DM2  - start home long acting insulin and SSI qprn  - will recheck A1c     F: none  E: replete K 40mEq  N: diabetic, NPO midnight  PPx: Heparin  Code: FULL       Mary E Conrad Yuan MD  \[facility]\"" Internal Medicine HO-I  \[facility]\"" Internal [facility]     \[facility]\"" Medicine Hospitalist Pager numbers:   \[facility]\"" Hospitalist Medicine Team A (Trevor/Ricarda): [phone]  \[facility]\"" Hospitalist Medicine Team B (Shonna/Dameon):  [phone]

## 2019-02-14 NOTE — LETTER
February 15, 2019         [address] [place] [address] Sergey MACIAS [address]  Phone: [phone]  Fax: [phone]       Patient: Karen Leone   YOB: 1955  Date of Visit: 02/15/2019    To Whom It May Concern:    Akosua Leone  was at [facility] from 2/14-2/15/2019. She may return to work/school on Monday, 2/18/2019 without restrictions. If you have any questions or concerns, or if I can be of further assistance, please do not hesitate to contact me.    Sincerely,        Mary Yuan MD  [phone]

## 2019-02-14 NOTE — ED PROVIDER NOTES
Encounter Date: 2/14/2019    SCRIBE #1 NOTE: I, Liza Johnson, am scribing for, and in the presence of,  Dr. Bolton. I have scribed the entire note.       History     Chief Complaint   Patient presents with    Fall     Pt reports just before falling her body muscles in her legs and shoulds began to twitch involuntarily causing her to lose control of her legs and fell hitting chin and R ribs and R knee. Currently twitching has resolved.      Karen Leone is a 63 y.o. female who  has a past medical history of Diabetes mellitus, type 2, Diverticulosis, Hyperlipidemia, and Hypertension.    The patient presents to the ED due to a fall. She states that just prior to falling the muscles in her legs began twitching involuntarily causing her to lose control of them. She fell forward and was unable to catch her fall. She denies tripping over anything. Patient fell forward hitting the right part of her chin, right hip, and right knee. She also states that she experienced the same muscle twitching in both shoulders a few hours prior to the fall. Patient is currently not feeling any spasms but reports muscle soreness secondary to the fall. Patient's BP was found to be elevated and blood sugar WNL.              The history is provided by the patient.     Review of patient's allergies indicates:   Allergen Reactions    Keflex [cephalexin] Hives    Metformin Diarrhea     Past Medical History:   Diagnosis Date    Diabetes mellitus, type 2     Diverticulosis     Hyperlipidemia     Hypertension      Past Surgical History:   Procedure Laterality Date    EYE SURGERY      TONSILLECTOMY       Family History   Problem Relation Age of Onset    Diabetes Mother      Social History     Tobacco Use    Smoking status: Former Smoker    Smokeless tobacco: Never Used   Substance Use Topics    Alcohol use: Yes     Alcohol/week: 9.0 oz     Types: 15 Standard drinks or equivalent per week    Drug use: No     Review of Systems    Constitutional: Negative for chills and fever.   HENT: Negative for congestion, rhinorrhea and sore throat.    Eyes: Negative for redness and visual disturbance.   Respiratory: Negative for cough, shortness of breath and wheezing.    Cardiovascular: Negative for chest pain and palpitations.   Gastrointestinal: Negative for abdominal pain, diarrhea, nausea and vomiting.   Genitourinary: Negative for dysuria and hematuria.   Musculoskeletal: Positive for arthralgias and myalgias. Negative for back pain and neck pain.   Skin: Negative for rash.   Neurological: Positive for tremors and weakness. Negative for dizziness and light-headedness.   Psychiatric/Behavioral: Negative for confusion.       Physical Exam     Initial Vitals [02/14/19 1010]   BP Pulse Resp Temp SpO2   (!) 220/98 73 16 98.2 °F (36.8 °C) 97 %      MAP       --         Physical Exam    Nursing note and vitals reviewed.  Constitutional: She appears well-developed and well-nourished. She is not diaphoretic. No distress.   HENT:   Head: Normocephalic and atraumatic.   Mouth/Throat: Oropharynx is clear and moist.   Eyes: Conjunctivae and EOM are normal. Pupils are equal, round, and reactive to light.   Neck: Normal range of motion. Neck supple.   Cardiovascular: Normal rate, regular rhythm and normal heart sounds. Exam reveals no gallop and no friction rub.    No murmur heard.  Pulmonary/Chest: Breath sounds normal. She has no wheezes. She has no rhonchi. She has no rales.   Abdominal: Soft. Bowel sounds are normal. There is no tenderness. There is no rebound and no guarding.   Musculoskeletal: Normal range of motion. She exhibits no edema or tenderness.   Lymphadenopathy:     She has no cervical adenopathy.   Neurological: She is alert and oriented to person, place, and time. She has normal strength.   Skin: Skin is warm and dry. Capillary refill takes less than 2 seconds. No rash noted.         ED Course   Procedures  Labs Reviewed   B-TYPE NATRIURETIC  PEPTIDE - Abnormal; Notable for the following components:       Result Value     (*)     All other components within normal limits   CBC W/ AUTO DIFFERENTIAL - Abnormal; Notable for the following components:    MCHC 31.9 (*)     All other components within normal limits   COMPREHENSIVE METABOLIC PANEL - Abnormal; Notable for the following components:    Potassium 3.3 (*)     BUN, Bld 32 (*)     Calcium 8.4 (*)     Albumin 3.4 (*)     Alkaline Phosphatase 53 (*)     eGFR if  50 (*)     eGFR if non  43 (*)     All other components within normal limits   TROPONIN I - Abnormal; Notable for the following components:    Troponin I 0.051 (*)     All other components within normal limits   URINALYSIS, REFLEX TO URINE CULTURE - Abnormal; Notable for the following components:    Nitrite, UA Positive (*)     All other components within normal limits    Narrative:     Preferred Collection Type->Urine, Clean Catch   URINALYSIS MICROSCOPIC - Abnormal; Notable for the following components:    Bacteria, UA Many (*)     All other components within normal limits    Narrative:     Preferred Collection Type->Urine, Clean Catch   LACTIC ACID, PLASMA     EKG Readings: (Independently Interpreted)   Heart Rate: 72.   11:15  Normal sinus rhythm.  No ST or T wave changes.        Imaging Results          CT Maxillofacial Without Contrast (Final result)  Result time 02/14/19 12:43:14    Final result by Rajan Cedeno MD (02/14/19 12:43:14)                 Impression:      Mild subcutaneous edema and probable small hematoma overlying the right anterior mandible.  No facial fractures.      Electronically signed by: Rajan Cedeno MD  Date:    02/14/2019  Time:    12:43             Narrative:    EXAMINATION:  CT MAXILLOFACIAL WITHOUT CONTRAST    CLINICAL HISTORY:  Facial fracture(s);    TECHNIQUE:  1.25 mm axial images were obtained through the maxillofacial region without the use of contrast.  Coronal  and sagittal reformats were performed.    COMPARISON:  CT 02/14/2019.    FINDINGS:  No fractures.  No osseous destruction.  There is trace mucosal membrane thickening within the right ethmoid air cells.  Remaining paranasal sinuses and mastoids are clear.  Mandibular condyles are intact.  Degenerative changes of the cervical spine identified, most pronounced at C5-C6.  There is mild subcutaneous edema with probable small hematoma overlying the right anterior mandible.                               X-Ray Knee 3 View Right (Final result)  Result time 02/14/19 11:32:21    Final result by Fede Becker MD (02/14/19 11:32:21)                 Impression:      No fracture, dislocation or joint effusion.      Electronically signed by: Fede Becker MD  Date:    02/14/2019  Time:    11:32             Narrative:    EXAMINATION:  XR KNEE 3 VIEW RIGHT    CLINICAL HISTORY:  Unspecified fall, initial encounter    TECHNIQUE:  AP, lateral, and Merchant views of the right knee were performed.    COMPARISON:  None    FINDINGS:  Hypertrophic change superior anterior patellar insertion site of extensor tendon.  Early DJD changes intercondylar and spinous process region tibial plateau and lateral patellofemoral joint.                               CT Head Without Contrast (Final result)  Result time 02/14/19 11:06:18    Final result by Rajan Cedeno MD (02/14/19 11:06:18)                 Impression:      1. No acute intracranial CT abnormalities.      Electronically signed by: Rajan Cedeno MD  Date:    02/14/2019  Time:    11:06             Narrative:    EXAMINATION:  CT HEAD WITHOUT CONTRAST    CLINICAL HISTORY:  Confusion/delirium, altered LOC, unexplained;    TECHNIQUE:  5-mm axial images were obtained through the head without the use of contrast.  Coronal and sagittal reformats were performed.    COMPARISON:  None.    FINDINGS:  No CT findings to suggest an acute major vascular distribution infarct.  No intra  or extra-axial hemorrhage.  No midline shift or mass effect.  No hydrocephalus.  Sellar region is unremarkable.    There is trace mucosal membrane thickening within the right ethmoid air cells.  Remaining paranasal sinuses and mastoid air cells are clear.  No acute osseous abnormalities.  Subcutaneous soft tissues are normal.                               X-Ray Chest AP Portable (Final result)  Result time 02/14/19 10:50:16    Final result by Antoni Cheng MD (02/14/19 10:50:16)                 Impression:      No acute abnormality.      Electronically signed by: Antoni Cheng MD  Date:    02/14/2019  Time:    10:50             Narrative:    EXAMINATION:  XR CHEST AP PORTABLE    CLINICAL HISTORY:  weakness;    TECHNIQUE:  Single frontal view of the chest was performed.    COMPARISON:  None    FINDINGS:  The lungs are clear, with normal appearance of pulmonary vasculature and no pleural effusion or pneumothorax.    The cardiac silhouette is normal in size. The hilar and mediastinal contours are unremarkable.    Bones are intact.    High-density material overlying the left upper quadrant of the abdomen, likely external to the patient.                                 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  She fell on the ground and denies any loss of consciousness but felt transient weakness to her bilateral lower extremities which has resolved.  The patient is asymptomatic at this time however she has a large contusion to her chin pain to her right ribs and her right knee.  All x-rays and CT scans are within normal limits. On labs, her troponin is elevated and her creatinine is 1.3.   Case will be discussed with [facility] internal medicine attending and the patient will be admitted for telemetry and observation.  Troponin will be trended.                   ED Course as of Feb 14 1331   Thu Feb 14, 2019   1012 This is a SORT/MSE of a 63 y.o. female presenting to the ED with c/o fall prior to arrival. Patient reports that before she fell her body muscles in her legs and shoulder began to twitch involuntarily causing her to lose control and fall. Patient c/o pain to chin, ribs, and right knee. Patient reports that her  Pertinent exam findings remarkable for elevated blood pressure. Care will be transferred to an alternate provider when patient is roomed for a full evaluation and final disposition. SHARON Parish 10:12 AM   [CH]      ED Course User Index  [CH] Jourdan Flowers NP     Clinical Impression:     1. Weakness    2. Fall    3. Near syncope               I, Jacey Bolton, personally performed the services described in this documentation. All medical record entries made by the scribe were at my direction and in my presence.  I have reviewed the chart and agree that the record reflects my personal performance and is accurate and complete. Jacey Bolton M.D. 1:33 PM02/14/2019                   Jacey Bolton MD  02/14/19 [id number]

## 2019-02-14 NOTE — ED NOTES
When inserting Pt's IV, slight trembling noted to LUE, RUE normal, no drift, when smiles, lips are slightly deviated left, when sticks out tongue, tip of tongue slightly deviated to left. Dr. Bolton notified.

## 2019-02-14 NOTE — ED NOTES
"Pt arrived via wheelchair. 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 Denies LOC. C/O pain to chin (bruising noted), Right lateral side, and right knee.  "

## 2019-02-14 NOTE — ED NOTES
Pt refused Levothyroxine, states she took it this am. She also refused Lomotil, med should have been ordered PRN.

## 2019-02-15 VITALS
HEART RATE: 61 BPM | RESPIRATION RATE: 17 BRPM | DIASTOLIC BLOOD PRESSURE: 67 MMHG | HEIGHT: 60 IN | WEIGHT: 127.88 LBS | TEMPERATURE: 99 F | OXYGEN SATURATION: 94 % | BODY MASS INDEX: 25.11 KG/M2 | SYSTOLIC BLOOD PRESSURE: 153 MMHG

## 2019-02-15 LAB
ALBUMIN SERPL BCP-MCNC: 3.3 G/DL
ALP SERPL-CCNC: 53 U/L
ALT SERPL W/O P-5'-P-CCNC: 11 U/L
ANION GAP SERPL CALC-SCNC: 13 MMOL/L
AST SERPL-CCNC: 20 U/L
BILIRUB SERPL-MCNC: 0.5 MG/DL
BUN SERPL-MCNC: 28 MG/DL
CALCIUM SERPL-MCNC: 8.6 MG/DL
CHLORIDE SERPL-SCNC: 99 MMOL/L
CO2 SERPL-SCNC: 26 MMOL/L
CREAT SERPL-MCNC: 1.2 MG/DL
EST. GFR  (AFRICAN AMERICAN): 55 ML/MIN/1.73 M^2
EST. GFR  (NON AFRICAN AMERICAN): 48 ML/MIN/1.73 M^2
GLUCOSE SERPL-MCNC: 78 MG/DL
POCT GLUCOSE: 196 MG/DL (ref 70–110)
POCT GLUCOSE: 85 MG/DL (ref 70–110)
POTASSIUM SERPL-SCNC: 3.5 MMOL/L
PROT SERPL-MCNC: 6.8 G/DL
SODIUM SERPL-SCNC: 138 MMOL/L
TROPONIN I SERPL DL<=0.01 NG/ML-MCNC: 0.07 NG/ML

## 2019-02-15 PROCEDURE — G0378 HOSPITAL OBSERVATION PER HR: HCPCS

## 2019-02-15 PROCEDURE — 95816 PR EEG,W/AWAKE & DROWSY RECORD: ICD-10-PCS | Mod: 26,,, | Performed by: PSYCHIATRY & NEUROLOGY

## 2019-02-15 PROCEDURE — 63700000 PHARM REV CODE 250 ALT 637 W/O HCPCS: Performed by: STUDENT IN AN ORGANIZED HEALTH CARE EDUCATION/TRAINING PROGRAM

## 2019-02-15 PROCEDURE — 63600175 PHARM REV CODE 636 W HCPCS: Performed by: STUDENT IN AN ORGANIZED HEALTH CARE EDUCATION/TRAINING PROGRAM

## 2019-02-15 PROCEDURE — 94761 N-INVAS EAR/PLS OXIMETRY MLT: CPT

## 2019-02-15 PROCEDURE — 25000003 PHARM REV CODE 250: Performed by: STUDENT IN AN ORGANIZED HEALTH CARE EDUCATION/TRAINING PROGRAM

## 2019-02-15 PROCEDURE — 93010 ELECTROCARDIOGRAM REPORT: CPT | Mod: ,,, | Performed by: INTERNAL MEDICINE

## 2019-02-15 PROCEDURE — 80053 COMPREHEN METABOLIC PANEL: CPT

## 2019-02-15 PROCEDURE — 96372 THER/PROPH/DIAG INJ SC/IM: CPT

## 2019-02-15 PROCEDURE — 95816 EEG AWAKE AND DROWSY: CPT | Mod: 26,,, | Performed by: PSYCHIATRY & NEUROLOGY

## 2019-02-15 PROCEDURE — 95816 EEG AWAKE AND DROWSY: CPT

## 2019-02-15 PROCEDURE — 93010 EKG 12-LEAD: ICD-10-PCS | Mod: ,,, | Performed by: INTERNAL MEDICINE

## 2019-02-15 PROCEDURE — 36415 COLL VENOUS BLD VENIPUNCTURE: CPT

## 2019-02-15 PROCEDURE — 93005 ELECTROCARDIOGRAM TRACING: CPT

## 2019-02-15 RX ORDER — HYDRALAZINE HYDROCHLORIDE 10 MG/1
10 TABLET, FILM COATED ORAL EVERY 8 HOURS
Status: DISCONTINUED | OUTPATIENT
Start: 2019-02-15 | End: 2019-02-15 | Stop reason: HOSPADM

## 2019-02-15 RX ORDER — LISINOPRIL 40 MG/1
40 TABLET ORAL DAILY
Qty: 30 TABLET | Refills: 3 | Status: SHIPPED | OUTPATIENT
Start: 2019-02-15

## 2019-02-15 RX ORDER — HYDRALAZINE HYDROCHLORIDE 10 MG/1
10 TABLET, FILM COATED ORAL EVERY 8 HOURS
Qty: 90 TABLET | Refills: 3 | Status: SHIPPED | OUTPATIENT
Start: 2019-02-15

## 2019-02-15 RX ADMIN — FENOFIBRATE 160 MG: 160 TABLET ORAL at 08:02

## 2019-02-15 RX ADMIN — PANTOPRAZOLE SODIUM 20 MG: 20 TABLET, DELAYED RELEASE ORAL at 08:02

## 2019-02-15 RX ADMIN — HYDRALAZINE HYDROCHLORIDE 10 MG: 10 TABLET ORAL at 12:02

## 2019-02-15 RX ADMIN — HEPARIN SODIUM 5000 UNITS: 5000 INJECTION, SOLUTION INTRAVENOUS; SUBCUTANEOUS at 08:02

## 2019-02-15 RX ADMIN — LEVOTHYROXINE SODIUM 50 MCG: 50 TABLET ORAL at 05:02

## 2019-02-15 RX ADMIN — HYDRALAZINE HYDROCHLORIDE 10 MG: 10 TABLET ORAL at 05:02

## 2019-02-15 RX ADMIN — HYDROCHLOROTHIAZIDE 25 MG: 25 TABLET ORAL at 08:02

## 2019-02-15 RX ADMIN — AMLODIPINE BESYLATE 10 MG: 5 TABLET ORAL at 08:02

## 2019-02-15 RX ADMIN — METOPROLOL SUCCINATE 50 MG: 50 TABLET, EXTENDED RELEASE ORAL at 08:02

## 2019-02-15 RX ADMIN — LISINOPRIL 40 MG: 20 TABLET ORAL at 08:02

## 2019-02-15 NOTE — ED NOTES
Pt has Observation Bed 426A. Called Charge Nurse to have nurse assigned to patient call for report after shift report.

## 2019-02-15 NOTE — DISCHARGE INSTRUCTIONS
Hydralazine tablets (English) View Edit Remove   Falls, Preventing, Staying Active (English) View Edit Remove   Fall Due To Dizziness, Weakness, Or Loss Of Balance (English) View Edit Remove

## 2019-02-15 NOTE — PROGRESS NOTES
U Internal Medicine Resident EMILY Progress Note    Subjective:      Karen Leone is a 63 y.o. female who  has a past medical history of Diabetes mellitus, type 2, Diverticulosis, Hyperlipidemia, and Hypertension.. The patient presented to [facility] on 2019 with a primary complaint of Fall (Pt reports just before falling her body muscles in her legs and shoulds began to twitch involuntarily causing her to lose control of her legs and fell hitting chin and R ribs and R knee. Currently twitching has resolved. )  x1    Ms. Leone is doing well this morning, has not had any further episodes of weakness. Also denies chest pain, shortness of breath, radiation or diaphoresis. She is relieved to hear that she won't be getting a stress test this morning as she is very hungry.     Objective:   Last 24 Hour Vital Signs:  BP  Min: 172/74  Max: 220/98  Temp  Av.3 °F (36.8 °C)  Min: 96.7 °F (35.9 °C)  Max: 99.4 °F (37.4 °C)  Pulse  Av.1  Min: 52  Max: 78  Resp  Av.4  Min: 16  Max: 21  SpO2  Av.1 %  Min: 94 %  Max: 100 %  Height  Av' (152.4 cm)  Min: 5' (152.4 cm)  Max: 5' (152.4 cm)  Weight  Av.8 kg (127 lb 6.9 oz)  Min: 57.6 kg (127 lb)  Max: 58 kg (127 lb 13.9 oz)  I/O last 3 completed shifts:  In: -   Out: 1100 [Urine:1100]    Physical Examination:  Physical Exam   Constitutional: She is oriented to person, place, and time and well-developed, well-nourished, and in no distress.   HENT:   Head: Normocephalic and atraumatic.       Nose: Nose normal.   Mouth/Throat: Oropharynx is clear and moist. No oropharyngeal exudate.   Eyes: EOM are normal. Pupils are equal, round, and reactive to light. No scleral icterus.   Neck: Normal range of motion. Neck supple. No JVD present. No thyromegaly present.   Cardiovascular: Normal rate, regular rhythm, normal heart sounds and intact distal pulses. Exam reveals no gallop and no friction rub.   No murmur heard.  Pulmonary/Chest: Effort  normal and breath sounds normal. No respiratory distress. She has no wheezes. She has no rales.   Abdominal: Soft. Bowel sounds are normal. She exhibits no distension and no mass. There is no tenderness. There is no rebound and no guarding.   Musculoskeletal: Normal range of motion. She exhibits no edema, tenderness or deformity.   Neurological: She is alert and oriented to person, place, and time. She is not disoriented. She displays no weakness, no tremor, facial symmetry and normal speech. No cranial nerve deficit or sensory deficit. GCS score is 15.   Skin: Skin is warm and dry. No rash noted. She is not diaphoretic. No erythema.       Laboratory:  Laboratory Data Reviewed: yes  Pertinent Findings:  Recent Labs   Lab 02/14/19  1144 02/15/19  0346   WBC 8.18  --    HGB 12.1  --    HCT 37.9  --      --    MCV 87  --    RDW 14.4  --     138   K 3.3* 3.5   CL 98 99   CO2 26 26   BUN 32* 28*   CREATININE 1.3 1.2   GLU 81 78   PROT 7.0 6.8   ALBUMIN 3.4* 3.3*   BILITOT 0.4 0.5   AST 24 20   ALKPHOS 53* 53*   ALT 13 11       Microbiology Data Reviewed: yes  Pertinent Findings:  None    Other Results:  EKG (my interpretation): sinus rhythm     Radiology Data Reviewed: yes  Pertinent Findings:  Imaging Results          CTA Head and Neck (xpd) (Final result)  Result time 02/14/19 17:09:15    Final result by Rajan Cedeno MD (02/14/19 17:09:15)                 Impression:      1. No acute major vascular distribution infarct or intracranial hemorrhage.  2. No significant arterial abnormality.  3. Mild centrilobular emphysema within the upper lungs.      Electronically signed by: Rajan Cedeno MD  Date:    02/14/2019  Time:    17:09             Narrative:    EXAMINATION:  CTA HEAD AND NECK (XPD)    CLINICAL HISTORY:  TIA concern;Weakness    TECHNIQUE:  Non contrast low dose axial images were obtained through the head.  CT angiogram was performed from the level of the valerie to the top of the head  following the IV administration of 100 mL of Omnipaque 350.   Sagittal and coronal reconstructions and maximum intensity projection reconstructions were performed. Arterial stenosis percentages are based on NASCET measurement criteria.    COMPARISON:  MRI 02/14/2019.    FINDINGS:  CT head:    No CT findings to suggest an acute major vascular distribution infarct.  No intracranial hemorrhage.  No abnormal intra or extra-axial fluid collections.  No hydrocephalus.  No midline shift or mass effect.  No pathologic enhancement on post-contrast images.    There is patchy mucosal membrane thickening within the ethmoid air cells.  Remaining visualized paranasal sinuses and mastoids are clear.  No acute osseous abnormalities.  Subcutaneous soft tissues are within normal limits.    CTA head:    No intracranial stenosis, occlusion or focal aneurysm.    CTA neck:    Left-sided 3 vessel arch.    Extracranial vertebral circulation without focal stenosis or occlusion.    Extracranial carotid circulation without focal stenosis or occlusion.  Mild atherosclerotic calcification at the bilateral carotid bifurcations.    CT neck:    Parotid, submandibular and thyroid glands are normal.    No cervical lymph node enlargement.    No pharyngeal or laryngeal masses.    Prevertebral soft tissues are normal.    Paraspinal musculature demonstrates normal bulk and signal intensity.    Visualized lungs demonstrate mild centrilobular emphysematous changes.  No pneumothorax.    Subcutaneous soft tissues are within normal limits.    Degenerative changes of the spine identified.  No acute fractures or osseous destruction.                               MRI Brain Without Contrast (Final result)  Result time 02/14/19 16:13:19    Final result by Antoni Cheng MD (02/14/19 16:13:19)                 Impression:      No acute abnormality.    Minimal chronic microvascular ischemic changes.      Electronically signed by: Antoni Cheng  MD  Date:    02/14/2019  Time:    16:13             Narrative:    EXAMINATION:  MRI BRAIN WITHOUT CONTRAST    CLINICAL HISTORY:  concern for TIA; Weakness    TECHNIQUE:  Multiplanar multisequence MR imaging of the brain was performed without contrast.    COMPARISON:  CT from 02/14/2019    FINDINGS:  Intracranial compartment:    Ventricles and sulci are normal in size for age without evidence of hydrocephalus.  No extra-axial blood or fluid collections    A few small scattered increased T2/FLAIR signal abnormality of the supratentorial white matter which is nonspecific but likely represents chronic microvascular ischemic changes. no mass lesion, acute hemorrhage, edema or acute infarct.    Normal vascular flow voids are preserved.    Skull/extracranial contents (limited evaluation): Bone marrow signal intensity is normal.                               CT Maxillofacial Without Contrast (Final result)  Result time 02/14/19 12:43:14    Final result by Rajan Cedeno MD (02/14/19 12:43:14)                 Impression:      Mild subcutaneous edema and probable small hematoma overlying the right anterior mandible.  No facial fractures.      Electronically signed by: Rajan Cedeno MD  Date:    02/14/2019  Time:    12:43             Narrative:    EXAMINATION:  CT MAXILLOFACIAL WITHOUT CONTRAST    CLINICAL HISTORY:  Facial fracture(s);    TECHNIQUE:  1.25 mm axial images were obtained through the maxillofacial region without the use of contrast.  Coronal and sagittal reformats were performed.    COMPARISON:  CT 02/14/2019.    FINDINGS:  No fractures.  No osseous destruction.  There is trace mucosal membrane thickening within the right ethmoid air cells.  Remaining paranasal sinuses and mastoids are clear.  Mandibular condyles are intact.  Degenerative changes of the cervical spine identified, most pronounced at C5-C6.  There is mild subcutaneous edema with probable small hematoma overlying the right anterior mandible.                                X-Ray Knee 3 View Right (Final result)  Result time 02/14/19 11:32:21    Final result by Fede Becker MD (02/14/19 11:32:21)                 Impression:      No fracture, dislocation or joint effusion.      Electronically signed by: Fede Becker MD  Date:    02/14/2019  Time:    11:32             Narrative:    EXAMINATION:  XR KNEE 3 VIEW RIGHT    CLINICAL HISTORY:  Unspecified fall, initial encounter    TECHNIQUE:  AP, lateral, and Merchant views of the right knee were performed.    COMPARISON:  None    FINDINGS:  Hypertrophic change superior anterior patellar insertion site of extensor tendon.  Early DJD changes intercondylar and spinous process region tibial plateau and lateral patellofemoral joint.                               CT Head Without Contrast (Final result)  Result time 02/14/19 11:06:18    Final result by Rajan Cedeno MD (02/14/19 11:06:18)                 Impression:      1. No acute intracranial CT abnormalities.      Electronically signed by: Rajan Cedeno MD  Date:    02/14/2019  Time:    11:06             Narrative:    EXAMINATION:  CT HEAD WITHOUT CONTRAST    CLINICAL HISTORY:  Confusion/delirium, altered LOC, unexplained;    TECHNIQUE:  5-mm axial images were obtained through the head without the use of contrast.  Coronal and sagittal reformats were performed.    COMPARISON:  None.    FINDINGS:  No CT findings to suggest an acute major vascular distribution infarct.  No intra or extra-axial hemorrhage.  No midline shift or mass effect.  No hydrocephalus.  Sellar region is unremarkable.    There is trace mucosal membrane thickening within the right ethmoid air cells.  Remaining paranasal sinuses and mastoid air cells are clear.  No acute osseous abnormalities.  Subcutaneous soft tissues are normal.                               X-Ray Chest AP Portable (Final result)  Result time 02/14/19 10:50:16    Final result by Antoni Cheng MD  "(02/14/19 10:50:16)                 Impression:      No acute abnormality.      Electronically signed by: Antoni Cheng MD  Date:    02/14/2019  Time:    10:50             Narrative:    EXAMINATION:  XR CHEST AP PORTABLE    CLINICAL HISTORY:  weakness;    TECHNIQUE:  Single frontal view of the chest was performed.    COMPARISON:  None    FINDINGS:  The lungs are clear, with normal appearance of pulmonary vasculature and no pleural effusion or pneumothorax.    The cardiac silhouette is normal in size. The hilar and mediastinal contours are unremarkable.    Bones are intact.    High-density material overlying the left upper quadrant of the abdomen, likely external to the patient.                                  Current Medications:     Infusions:       Scheduled:   amLODIPine  10 mg Oral Daily    atorvastatin  80 mg Oral QHS    cetirizine  5 mg Oral Daily    fenofibrate  160 mg Oral Daily    heparin (porcine)  5,000 Units Subcutaneous Q12H    hydrALAZINE  10 mg Oral Q8H    hydroCHLOROthiazide  25 mg Oral Daily    insulin detemir U-100  20 Units Subcutaneous QHS    levothyroxine  50 mcg Oral Before breakfast    lisinopril  40 mg Oral Daily    metoprolol succinate  50 mg Oral Daily    pantoprazole  20 mg Oral Daily        PRN:  acetaminophen, dextrose 50%, dextrose 50%, diphenoxylate-atropine 2.5-0.025 mg, glucagon (human recombinant), glucose, glucose, insulin aspart U-100, sodium chloride 0.9%, traMADol    Antibiotics and Day Number of Therapy:  None     Lines and Day Number of Therapy:  PIV     Assessment:     Karen Leone is a 63 y.o. female who  has a past medical history of Diabetes mellitus, type 2, Diverticulosis, Hyperlipidemia, and Hypertension.. The patient presented to [facility] on 2/14/2019 with a primary complaint of fall x1.      Plan:     "Trembling" with fall, pre-syncope vs. TIA vs. unknown origin  - pt reports sudden onset "trembling" and locking up of bilateral " LEs with mechanical fall. Contusions to right mandible, ribs and knee. Denies LOC.   - denies palpitations, h/o arrhythmia   - SIRS 0/3  vitals stable   - ED /98  Trp 0.051 --> 0.087 --> 0.066    lactate wnl   - CT maxillofacial mild SQ edema and hematoma overlying right anterior mandible, no fracture  XR knee no fracture, dislocation or effusion  CT head without acute intracranial abnormalities  CXR no acute abnormality   - CK, TSH/T4 wnl    - EKG unremarkable  TTE with normal LVSF (EF 60%), grade II diastolic dysfunction consistent with pseudonormalization, elevated LAP, mild LAE  - consulted Cardiology: will see as close outpatient follow up, do not feel emergent stress is necessary. Recommend order renal us.   - consulted Neurology, will order MRI and CTA without significant abnormality, EEG ordered.      Asymptomatic bacteriuria   - positive for bacteria and nitrite   - no leukocytosis, afebrile since admit   - pt denies symptoms of UTI   - continue to monitor      HTN urgency  - BP in /98, pt took medications after fall on the way to emergency room  - change ACEI to 40, give norvasc  5 (took 5mg earlier)  - start norvasc 10, lisinopril 40, metoprolol 50, HCTZ 25  - SBP remained elevated, started hydralazine po tid, will continue on discharge      HLD  - start home fenofibrate 160 and lipitor   - lipid panel at goal      Hypothyroidism  - start home synthroid  - will recheck TSH and T4      DM2  - start home long acting insulin and SSI qprn  - A1c 7.3      F: none  E: as needed  N: diabetic  PPx: Heparin  Code: FULL     Dispo: likely discharge to home today following neuro consult.     Mary Yuan  [facility] Internal Medicine HO-I  U Internal Medicine Service Team B    [facility] Medicine Hospitalist Pager numbers:   [facility] Hospitalist Medicine Team A (Trevor/Ricarda): [phone]  [facility] Hospitalist Medicine Team B (Shonna/Dameon):  [phone]

## 2019-02-15 NOTE — PLAN OF CARE
Problem: Adult Inpatient Plan of Care  Goal: Plan of Care Review  Outcome: Ongoing (interventions implemented as appropriate)  Plan of care reviewed patient verbalized understanding AAOx4 room air. Patient transferred from the ED. Blood glucose monitoring per sliding scale. Cardiac monitoring NSR. Medications administered. Patient remained NPO. Safety maintained. Call bell within reach, bed alarm on, bed in lowest position. Will continue to monitor.

## 2019-02-15 NOTE — H&P
U CARDIOLOGY INPATIENT CONSULTATION    Attending Cardiologist: Dr. Lai  Cardiology Fellow: Dr. Felix  Physician Requesting Consultation: Dr. Kilpatrick  Chief Complaint/Reason For Consultation:  Elevated Troponin/Hypertensive Urgency    HPI: Karen Leone is a 62 y/o F with a PMH significant for HTN, HLD, DM Type II, and Diverticulosis who presents to [facility] after experiencing a mechanical fall while at work. 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 Ms. Leone states that on the morning of admission she was working a case and first felt R shoulder tingling and twitching which then progressed to difficulty with moving her R upper extremity. This feeling than spread to her L shoulder however her symptoms quickly resolved. She further states she was finishing the case when this same feeling occurred in her legs bilaterally. She states she went to take a step and couldn't move her leg and than subsequently fell to the ground. During this time, she denies LOC, dizziness, chest pain, heart palpitations, or SOB. She does take multiple BP medications and states she had not taken them prior to this event which is not unusual for her to do.     She states she has never experienced any prior episodes like this before and denies any seizure activity in the past. At baseline, she performs all her ADL's and can exert herself without restriction.     Past Medical History  HTN  HLD  DM Type II  Diverticulosis    Surgical History  Tonsillectomy  Eye procedure, bilaterally  L knee tendon repair     Family History  Mother - Diabetes    Social History  Tobacco - former, 20 pack-year history   EtOH - current, 3 cocktails/night  Drugs - denies    Allergies  Keflex - hives, denies angioedema   Metformin - diarrhea    Home Medications  Lisinopril 60mg qD   HCTZ 25  Norvasc 5  Metoprolol XL 50  Basaglar 28U qHS   Glipzide ER 20  Protonix 40  Acyclovir 200mg x5 days qprn  Tramadol 50mg q6h prn   Lamotil 1 tab  tid  ofran 4mg bid   Clacium 500mg   Vit D 50K units qweek  Lipitor 80 qHS   Synthroid 50   Fenofibrate 150   Claritin 10mg     Review of Systems  Review of Systems - Negative except as noted above.     OBJECTIVE    Physical Exam  General: Resting comfortably in no acute distress  Head: Normocephalic/bruising noted over chin  Neck: Jugular venous pressure within normal limits, no carotid bruits auscultated   Lungs: Chest clear to auscultation bilaterally, no wheezes, no rales   Chest wall: No chest wall tenderness  Heart: Regular rate and rhythm, no murmurs/rubs/gallops  Abdomen: soft, non-tender, non-distended  Extremities: no cyanosis, clubbing or edema   Skin: warm, dry, no rashes     Lab Results  Results for ADAN CASIANO (MRN [id number]) as of 2/15/2019 08:43   Ref. Range 2/14/2019 11:44   WBC Latest Ref Range: 3.90 - 12.70 K/uL 8.18   RBC Latest Ref Range: 4.00 - 5.40 M/uL 4.36   Hemoglobin Latest Ref Range: 12.0 - 16.0 g/dL 12.1   Hematocrit Latest Ref Range: 37.0 - 48.5 % 37.9   MCV Latest Ref Range: 82 - 98 fL 87   MCH Latest Ref Range: 27.0 - 31.0 pg 27.8   MCHC Latest Ref Range: 32.0 - 36.0 g/dL 31.9 (L)   RDW Latest Ref Range: 11.5 - 14.5 % 14.4   Platelets Latest Ref Range: 150 - 350 K/uL 310     Results for ADAN CASIANO (MRN [id number]) as of 2/15/2019 08:55   Ref. Range 2/15/2019 03:46   Sodium Latest Ref Range: 136 - 145 mmol/L 138   Potassium Latest Ref Range: 3.5 - 5.1 mmol/L 3.5   Chloride Latest Ref Range: 95 - 110 mmol/L 99   CO2 Latest Ref Range: 23 - 29 mmol/L 26   Anion Gap Latest Ref Range: 8 - 16 mmol/L 13   BUN, Bld Latest Ref Range: 8 - 23 mg/dL 28 (H)   Creatinine Latest Ref Range: 0.5 - 1.4 mg/dL 1.2   eGFR if non African American Latest Ref Range: >60 mL/min/1.73 m^2 48 (A)   eGFR if African American Latest Ref Range: >60 mL/min/1.73 m^2 55 (A)   Glucose Latest Ref Range: 70 - 110 mg/dL 78   Calcium Latest Ref Range: 8.7 - 10.5 mg/dL 8.6 (L)   Alkaline Phosphatase Latest Ref Range: 55 -  135 U/L 53 (L)   Total Protein Latest Ref Range: 6.0 - 8.4 g/dL 6.8   Albumin Latest Ref Range: 3.5 - 5.2 g/dL 3.3 (L)   Total Bilirubin Latest Ref Range: 0.1 - 1.0 mg/dL 0.5   AST Latest Ref Range: 10 - 40 U/L 20   ALT Latest Ref Range: 10 - 44 U/L 11      Results for ADAN CASIANO (MRN [id number]) as of 2/15/2019 08:55   Ref. Range 2019 11:44 2019 20:34 2019 23:48   Troponin I Latest Ref Range: 0.000 - 0.026 ng/mL 0.051 (H) 0.087 (H) 0.066 (H)       Cardiovascular Studies    EC2019 NSR, Normal ECG    ECHO:   2019  · Normal left ventricular systolic function. The estimated ejection fraction is 60%  · Grade II (moderate) left ventricular diastolic dysfunction consistent with pseudonormalization.  · Elevated left atrial pressure.  · Mild left atrial enlargement.  · Normal right ventricular systolic function.  · Normal central venous pressure (3 mm Hg).     Increased left heart chamber filling pressures with normal LV systolic function.       ASSESSMENT/PLAN    Elevated Troponin  -BP upon admission with systolic in ~200's  -Troponin noted to be mildly elevated during this admission, 0.051->0.087->0.066  -Patient denies chest pain, palpitations, SOB  -ECHO results as above, no wall motion abnormalities appreciated  -Elevated troponin's likely 2/2 to uncontrolled HTN  -Recommend follow up with cardiology as outpatient upon discharge.   -No further work up needed at this time as inpatient.     HTN  -Uncontrolled upon admission  -Currently on Amlodipine 10mg qDm, HCTZ 25mg qD, Toprol XL 50mg qD, Lisinopril 40mg qD  -BP's improved this AM, started on hydralazine 10mg TID overnight  -Given patient on multiple antihypertensives, recommend renal US w/ doppler to assess for renal vascular disease. Will follow up results.    Case discussed with attending and fellow. Please call [phone] with any questions. Thank you for this consult.    Malcom Sims,  HO-III  [facility] Cardiology Service

## 2019-02-15 NOTE — PLAN OF CARE
Pt with friend Briana at her bedside.  Briana will take her home.  No needs identified.  This  put name on white board and explained blue discharge folder to patient. Discharge planning brochure and/or business card given to patient.  Patient verbalized understanding.       02/15/19 3132   Discharge Assessment   Assessment Type Discharge Planning Assessment   Confirmed/corrected address and phone number on facesheet? Yes   Assessment information obtained from? Patient   Communicated expected length of stay with patient/caregiver yes   Prior to hospitilization cognitive status: Alert/Oriented;No Deficits   Prior to hospitalization functional status: Independent   Current cognitive status: Alert/Oriented;No Deficits   Current Functional Status: Independent   Lives With friend(s)   Able to Return to Prior Arrangements yes   Is patient able to care for self after discharge? Yes   Patient's perception of discharge disposition home or selfcare   Readmission Within the Last 30 Days no previous admission in last 30 days   Patient currently being followed by outpatient case management? No   Patient currently receives any other outside agency services? No   Equipment Currently Used at Home none   Do you have any problems affording any of your prescribed medications? No   Is the patient taking medications as prescribed? yes   Does the patient have transportation home? Yes   Transportation Anticipated family or friend will provide   Discharge Plan A Home;Home with family   Discharge Plan B Home;Home with family   DME Needed Upon Discharge  none   Patient/Family in Agreement with Plan yes

## 2019-02-15 NOTE — PLAN OF CARE
Appts scheduled.  No other needs identified at this time.      Feb20 Follow up with Gracia Gonzalez MD   Wednesday Feb 20, 2019   12:45pm  [address] [facility] [address]   [phone]           Follow up with [facility]   Wednesday Feb 20, 2019   1:20pm  [address] [facility] [place]   [phone]    Jul24 [facility] with [facility]   Wednesday Jul 24, 2019   12:15pm  [address]th Floor   [phone]             02/15/19 1515   Final Note   Assessment Type Final Discharge Note   Anticipated Discharge Disposition Home   Hospital Follow Up  Appt(s) scheduled? Yes   Discharge plans and expectations educations in teach back method with documentation complete? Yes   Right Care Referral Info   Post Acute Recommendation No Care

## 2019-02-15 NOTE — DISCHARGE SUMMARY
"[facility] Medicine Discharge Summary    Primary Team: [facility]   Attending Physician: Sal Kilpatrick MD  Resident: Ricarda  Intern: Conrad    Date of Admit: 2/14/2019  Date of Discharge: 2/15/2019    Discharge to: good  Condition: home    Discharge Diagnoses     Patient Active Problem List   Diagnosis    Fall    Pre-syncope    Essential hypertension    Hypothyroid    HLD (hyperlipidemia)    Type 2 diabetes mellitus without complication, with long-term current use of insulin    Hypertensive urgency    Weakness    Elevated troponin       Consultants and Procedures     Consultants:  L-Cards  Neurology    Procedures:       Brief History of Present Illness      Karen Leone is a 63 y.o. female who  has a past medical history of Diabetes mellitus, type 2, Diverticulosis, Hyperlipidemia, and Hypertension.. The patient presented to [facility] on 2/14/2019 with a primary complaint of Fall (Pt reports just before falling her body muscles in her legs and shoulds began to twitch involuntarily causing her to lose control of her legs and fell hitting chin and R ribs and R knee. Currently twitching has resolved. 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 She was in her usual state of health - independent of all ADLs - until the morning of admit. She was cleaning up the OR after a surgery and had the sudden onset of "twitching" and shaking in both of her legs. She felt as though she was frozen, could not move or lift her legs. This lasted for a few seconds until she fell, hitting her chin on the surgical tray and her right rib and knee on the floor. She denies loss of consciousness or confusion. Just prior to this morning's surgery, patient said she had similar feeling in both of her arms that self-resolved within seconds. The patient has never experienced anything like this before today. Denies facial droop, slurred speech, other " "weakness, fever, chills, chest pain, SOB or palpitations.     In the ED, patient's BP noted to be 220/98. She says she had not taken any of her medications for the day, was planning to after the procedure. She took them on the way to the ED.     For the full HPI please refer to the History & Physical from this admission.    Hospital Course By Problem with Pertinent Findings     "Trembling" with fall, of unknown origin  - DDx: Hypertensive emergency vs. TIA vs. pre-syncope vs. unknown origin  - pt reports sudden onset "trembling" and locking up of bilateral LEs with mechanical fall. Contusions to right mandible, ribs and knee. Denies LOC.   - denies palpitations, h/o arrhythmia   - SIRS 0/3  vitals stable   - ED /98  Trp 0.051 --> 0.087 --> 0.066    lactate wnl   - CT maxillofacial mild SQ edema and hematoma overlying right anterior mandible, no fracture  XR knee no fracture, dislocation or effusion  CT head without acute intracranial abnormalities  CXR no acute abnormality   - CK, TSH/T4 wnl    - EKG unremarkable  TTE with normal LVSF (EF 60%), grade II diastolic dysfunction consistent with pseudonormalization, elevated LAP, mild LAE  - consulted Cardiology: can follow up outpatient, do not feel in house stress is necessary. Recommend checking renal US for JEVON, ordered.   - consulted Neurology, MRI and CTA without significant abnormality, ordered EEG, will follow up. Neurology will see outpatient as pt back to baseline without deficits.      Asymptomatic bacteriuria   - positive for bacteria and nitrite   - no leukocytosis, afebrile since admit   - pt denies symptoms of UTI   - continue to monitor      HTN emergency  - BP in /98, pt took medications after fall on the way to emergency room  - change ACEI to 40, give norvasc  5 (took 5mg earlier)  - start norvasc 10, lisinopril 40, metoprolol 50, HCTZ 25  - SBP remained elevated, started hydralazine po tid, will continue on discharge  - ACEI " dosing 60 on admit to hospital, will decrease to 40 on discharge as there is no therapeutic benefit above 40 qd     HLD  - start home fenofibrate 160 and lipitor   - lipid panel at goal      Hypothyroidism  - start home synthroid  - will recheck TSH and T4      DM2  - start home long acting insulin and SSI qprn  - A1c 7.3     Discharge Medications        Medication List      START taking these medications    hydrALAZINE 10 MG tablet  Commonly known as:  APRESOLINE  Take 1 tablet (10 mg total) by mouth every 8 (eight) hours.        CHANGE how you take these medications    BASAGLAR KWIKPEN U-100 INSULIN glargine 100 units/mL (3mL) SubQ pen  Generic drug:  insulin  What changed:  Another medication with the same name was removed. Continue taking this medication, and follow the directions you see here.     lisinopril 40 MG tablet  Commonly known as:  PRINIVIL,ZESTRIL  Take 1 tablet (40 mg total) by mouth once daily.  What changed:  how much to take     LOMOTIL 2.5-0.025 mg per tablet  Generic drug:  diphenoxylate-atropine 2.5-0.025 mg  What changed:  Another medication with the same name was removed. Continue taking this medication, and follow the directions you see here.        CONTINUE taking these medications    amLODIPine 5 MG tablet  Commonly known as:  NORVASC     atorvastatin 80 MG tablet  Commonly known as:  LIPITOR     fenofibrate 160 MG Tab     glipiZIDE 10 MG Tr24  Commonly known as:  GLUCOTROL     levothyroxine 50 MCG tablet  Commonly known as:  SYNTHROID     loratadine 10 mg tablet  Commonly known as:  CLARITIN     metoprolol succinate 50 MG 24 hr tablet  Commonly known as:  TOPROL-XL     pantoprazole 20 MG tablet  Commonly known as:  PROTONIX     traMADol 50 mg tablet  Commonly known as:  ULTRAM     VITAMIN D2 50,000 unit Cap  Generic drug:  ergocalciferol        STOP taking these medications    benzocaine-menthol 15-2.6 mg Lozg     CALCIUM 500 + D 500 mg(1,250mg) -200 unit per tablet  Generic drug:   calcium-vitamin D3     ezetimibe 10 mg tablet  Commonly known as:  ZETIA     felbamate 400 MG Tab  Commonly known as:  FELBATOL     felodipine 10 MG 24 hr tablet  Commonly known as:  PLENDIL     niacin 500 MG Tab     ondansetron 4 mg/5 mL solution  Commonly known as:  ZOFRAN           Where to Get Your Medications      These medications were sent to [facility]  [address] [address] [address]    Hours:  Mon-Fri, 8a-5:30p Phone:  [phone]   · hydrALAZINE 10 MG tablet  · lisinopril 40 MG tablet         Discharge Information:   Diet:  Diabetic, cardiac, as tolerated     Physical Activity:  Regular, as tolerated     Instructions:  1. Take all medications as prescribed  2. Keep all follow-up appointments  3. Return to the hospital or call your primary care physicians if any worsening symptoms such as dizziness, weakness, trembling, confusion, headache, vision changes, slurred speech, facial droop, chest pain, shortness of breath occur.    Follow-Up Appointments:  Dr. Gracia Gonzalez (PCP) - call to check availability for sooner appointment  [address] [facility] [id number] [phone]    Dr. Carmela Mathew (Cardiology) - call to schedule   [address] [facility]  [address]  [place] [address] [phone]    Dr. Vj Perea (Neurology) - call to schedule    Mary Yuan  [facility]  [facility]

## 2019-02-15 NOTE — CONSULTS
Please see Consult note from 2/15/19 by Dr. Malcom Koehler and my attestation.    Ildefonso Lai MD

## 2019-02-15 NOTE — PLAN OF CARE
Problem: Adult Inpatient Plan of Care  Goal: Plan of Care Review  Outcome: Outcome(s) achieved Date Met: 02/15/19  Pt IV and tele box removed. Education on diet, medications and follow up care given. Medications delivered to bedside. Pt verbalized understanding of dc instructions. No acute distress noted at time of dc. Will continue to monitor.

## 2019-03-24 NOTE — PROCEDURES
DATE OF PROCEDURE:  02/15/2019.    EEG#:  OK-.    REFERRING PHYSICIAN:  Dr. Chowdary    This EEG was performed to assess for evidence of underlying epilepsy.    ELECTROENCEPHALOGRAM REPORT    METHODOLOGY:  Electroencephalographic (EEG) recording is recorded with   electrodes placed according to the International 10-20 placement system.  Thirty   two (32) channels of digital signal (sampling rate of 512/sec), including T1   and T2, were simultaneously recorded from the scalp and may include EKG, EMG,   and/or eye monitors.  Recording band pass was 0.1 to 512 Hz.  Digital video   recording of the patient is simultaneously recorded with the EEG.  The patient   is instructed to report clinical symptoms which may occur during the recording   session.  EEG and video recording are stored and archived in digital format.    Activation procedures, which include photic stimulation, hyperventilation and   instructing patients to perform simple tasks, are done in selected patients.    The EEG is displayed on a monitor screen and can be reviewed using different   montages.  Computer assisted-analysis is employed to detect spike and   electrographic seizure activity.   The entire record is submitted for computer   analysis.  The entire recording is visually reviewed, and the times identified   by computer analysis as being spikes or seizures are reviewed again.    Compressed spectral analysis (CSA) is also performed on the activity recorded   from each individual channel.  This is displayed as a power display of   frequencies from 0 to 30 Hz over time.   The CSA is reviewed looking for   asymmetries in power between homologous areas of the scalp, then compared with   the original EEG recording.    [employer] software was also utilized in the review of this study.  This software   suite analyzes the EEG recording in multiple domains.  Coherence and rhythmicity   are computed to identify EEG sections which may contain organized  seizures.    Each channel undergoes analysis to detect the presence of spike and sharp waves   which have special and morphological characteristics of epileptic activity.  The   routine EEG recording is converted from special into frequency domain.  This is   then displayed comparing homologous areas to identify areas of significant   asymmetry.  Algorithm to identify non-cortically generated artifact is used to   separate artifact from the EEG.    EEG FINDINGS:  The recording was obtained with a number of standard bipolar and   referential montages during wakefulness and drowsiness.  In the alert state, the   posterior background rhythm was a symmetric, well-modulated 11 Hz alpha rhythm,   which reacted symmetrically to eye opening.  Excessive beta range activity was   noted throughout the record, which was diffuse.  During drowsiness, the   background rhythm waxed and waned and there were periods of slowing.  No sleep   was recorded.  Activation procedures not performed.  There were no focal   abnormalities.  There were no interictal epileptiform abnormalities and no   clinical or electrographic seizures were recorded.    The EKG channel revealed sinus rhythm with occasional PVCs.    IMPRESSION:  This is a normal EEG during wakefulness and drowsiness.    CLINICAL CORRELATION:  The patient is a 63-year-old female who presented with   hypertensive emergency and is currently not maintained on any anti-seizure   medications.  This is a normal EEG during wakefulness and drowsiness, but there   is no evidence for either cortical dysfunction nor an epileptic process on this   recording.  No seizures were recorded during this study.      FAK/HN  dd: 03/24/2019 14:29:19 (CDT)  td: 03/24/2019 14:48:55 (CDT)  Doc ID   #[id number]  Job ID #[id number]    CC:

## 2020-04-21 DIAGNOSIS — Z01.84 ANTIBODY RESPONSE EXAMINATION: ICD-10-CM

## 2020-05-21 DIAGNOSIS — Z01.84 ANTIBODY RESPONSE EXAMINATION: ICD-10-CM

## 2020-06-20 DIAGNOSIS — Z01.84 ANTIBODY RESPONSE EXAMINATION: ICD-10-CM

## 2020-07-20 DIAGNOSIS — Z01.84 ANTIBODY RESPONSE EXAMINATION: ICD-10-CM

## 2020-08-19 DIAGNOSIS — Z01.84 ANTIBODY RESPONSE EXAMINATION: ICD-10-CM

## 2020-09-18 DIAGNOSIS — Z01.84 ANTIBODY RESPONSE EXAMINATION: ICD-10-CM

## 2020-10-18 DIAGNOSIS — Z01.84 ANTIBODY RESPONSE EXAMINATION: ICD-10-CM

## 2020-11-17 DIAGNOSIS — Z01.84 ANTIBODY RESPONSE EXAMINATION: ICD-10-CM

## 2020-12-16 ENCOUNTER — IMMUNIZATION (OUTPATIENT)
Dept: INTERNAL MEDICINE | Facility: CLINIC | Age: 65
End: 2020-12-16
Payer: MEDICARE

## 2020-12-16 DIAGNOSIS — Z23 NEED FOR VACCINATION: ICD-10-CM

## 2020-12-16 PROCEDURE — 91300 COVID-19, MRNA, LNP-S, PF, 30 MCG/0.3 ML DOSE VACCINE: ICD-10-PCS | Mod: ,,, | Performed by: FAMILY MEDICINE

## 2020-12-16 PROCEDURE — 91300 COVID-19, MRNA, LNP-S, PF, 30 MCG/0.3 ML DOSE VACCINE: CPT | Mod: ,,, | Performed by: FAMILY MEDICINE

## 2020-12-16 PROCEDURE — 0001A COVID-19, MRNA, LNP-S, PF, 30 MCG/0.3 ML DOSE VACCINE: ICD-10-PCS | Mod: CV19,,, | Performed by: FAMILY MEDICINE

## 2020-12-16 PROCEDURE — 0001A COVID-19, MRNA, LNP-S, PF, 30 MCG/0.3 ML DOSE VACCINE: CPT | Mod: CV19,,, | Performed by: FAMILY MEDICINE

## 2021-01-06 ENCOUNTER — IMMUNIZATION (OUTPATIENT)
Dept: INTERNAL MEDICINE | Facility: CLINIC | Age: 66
End: 2021-01-06
Payer: MEDICARE

## 2021-01-06 DIAGNOSIS — Z23 NEED FOR VACCINATION: ICD-10-CM

## 2021-01-06 PROCEDURE — 0002A COVID-19, MRNA, LNP-S, PF, 30 MCG/0.3 ML DOSE VACCINE: CPT | Mod: PBBFAC | Performed by: FAMILY MEDICINE

## 2021-01-06 PROCEDURE — 91300 COVID-19, MRNA, LNP-S, PF, 30 MCG/0.3 ML DOSE VACCINE: CPT | Mod: PBBFAC | Performed by: FAMILY MEDICINE

## 2021-03-18 ENCOUNTER — TELEPHONE (OUTPATIENT)
Dept: NEPHROLOGY | Facility: CLINIC | Age: 66
End: 2021-03-18

## 2021-03-19 ENCOUNTER — TELEPHONE (OUTPATIENT)
Dept: NEPHROLOGY | Facility: CLINIC | Age: 66
End: 2021-03-19
Payer: MEDICARE

## 2021-03-19 DIAGNOSIS — E11.9 TYPE 2 DIABETES MELLITUS WITHOUT COMPLICATION, WITH LONG-TERM CURRENT USE OF INSULIN: ICD-10-CM

## 2021-03-19 DIAGNOSIS — Z79.4 TYPE 2 DIABETES MELLITUS WITHOUT COMPLICATION, WITH LONG-TERM CURRENT USE OF INSULIN: ICD-10-CM

## 2021-03-19 DIAGNOSIS — I16.0 HYPERTENSIVE URGENCY: ICD-10-CM

## 2021-03-19 DIAGNOSIS — R79.89 ELEVATED TROPONIN: ICD-10-CM

## 2021-03-19 DIAGNOSIS — I10 ESSENTIAL HYPERTENSION: Primary | ICD-10-CM

## 2021-03-19 DIAGNOSIS — E78.5 HYPERLIPIDEMIA, UNSPECIFIED HYPERLIPIDEMIA TYPE: ICD-10-CM

## 2021-03-23 ENCOUNTER — PATIENT MESSAGE (OUTPATIENT)
Dept: NEPHROLOGY | Facility: CLINIC | Age: 66
End: 2021-03-23

## 2021-03-23 LAB — MAGNESIUM SERPL-MCNC: 1.6 MG/DL (ref 1.5–2.5)

## 2021-03-25 ENCOUNTER — OFFICE VISIT (OUTPATIENT)
Dept: NEPHROLOGY | Facility: CLINIC | Age: 66
End: 2021-03-25
Payer: MEDICARE

## 2021-03-25 VITALS
HEIGHT: 60 IN | DIASTOLIC BLOOD PRESSURE: 58 MMHG | HEART RATE: 73 BPM | BODY MASS INDEX: 24.82 KG/M2 | WEIGHT: 126.44 LBS | SYSTOLIC BLOOD PRESSURE: 102 MMHG

## 2021-03-25 DIAGNOSIS — Z79.4 TYPE 2 DIABETES MELLITUS WITH STAGE 3A CHRONIC KIDNEY DISEASE, WITH LONG-TERM CURRENT USE OF INSULIN: ICD-10-CM

## 2021-03-25 DIAGNOSIS — N18.31 STAGE 3A CHRONIC KIDNEY DISEASE: Primary | ICD-10-CM

## 2021-03-25 DIAGNOSIS — N18.31 TYPE 2 DIABETES MELLITUS WITH STAGE 3A CHRONIC KIDNEY DISEASE, WITH LONG-TERM CURRENT USE OF INSULIN: ICD-10-CM

## 2021-03-25 DIAGNOSIS — E11.22 TYPE 2 DIABETES MELLITUS WITH STAGE 3A CHRONIC KIDNEY DISEASE, WITH LONG-TERM CURRENT USE OF INSULIN: ICD-10-CM

## 2021-03-25 DIAGNOSIS — I10 ESSENTIAL HYPERTENSION: ICD-10-CM

## 2021-03-25 PROCEDURE — 1159F PR MEDICATION LIST DOCUMENTED IN MEDICAL RECORD: ICD-10-PCS | Mod: S$GLB,,, | Performed by: INTERNAL MEDICINE

## 2021-03-25 PROCEDURE — 99999 PR PBB SHADOW E&M-EST. PATIENT-LVL II: ICD-10-PCS | Mod: PBBFAC,,, | Performed by: INTERNAL MEDICINE

## 2021-03-25 PROCEDURE — 1101F PT FALLS ASSESS-DOCD LE1/YR: CPT | Mod: CPTII,S$GLB,, | Performed by: INTERNAL MEDICINE

## 2021-03-25 PROCEDURE — 99204 OFFICE O/P NEW MOD 45 MIN: CPT | Mod: S$GLB,,, | Performed by: INTERNAL MEDICINE

## 2021-03-25 PROCEDURE — 3074F SYST BP LT 130 MM HG: CPT | Mod: CPTII,S$GLB,, | Performed by: INTERNAL MEDICINE

## 2021-03-25 PROCEDURE — 3288F FALL RISK ASSESSMENT DOCD: CPT | Mod: CPTII,S$GLB,, | Performed by: INTERNAL MEDICINE

## 2021-03-25 PROCEDURE — 1126F AMNT PAIN NOTED NONE PRSNT: CPT | Mod: S$GLB,,, | Performed by: INTERNAL MEDICINE

## 2021-03-25 PROCEDURE — 3288F PR FALLS RISK ASSESSMENT DOCUMENTED: ICD-10-PCS | Mod: CPTII,S$GLB,, | Performed by: INTERNAL MEDICINE

## 2021-03-25 PROCEDURE — 1159F MED LIST DOCD IN RCRD: CPT | Mod: S$GLB,,, | Performed by: INTERNAL MEDICINE

## 2021-03-25 PROCEDURE — 3008F PR BODY MASS INDEX (BMI) DOCUMENTED: ICD-10-PCS | Mod: CPTII,S$GLB,, | Performed by: INTERNAL MEDICINE

## 2021-03-25 PROCEDURE — 3078F DIAST BP <80 MM HG: CPT | Mod: CPTII,S$GLB,, | Performed by: INTERNAL MEDICINE

## 2021-03-25 PROCEDURE — 99999 PR PBB SHADOW E&M-EST. PATIENT-LVL II: CPT | Mod: PBBFAC,,, | Performed by: INTERNAL MEDICINE

## 2021-03-25 PROCEDURE — 3078F PR MOST RECENT DIASTOLIC BLOOD PRESSURE < 80 MM HG: ICD-10-PCS | Mod: CPTII,S$GLB,, | Performed by: INTERNAL MEDICINE

## 2021-03-25 PROCEDURE — 3008F BODY MASS INDEX DOCD: CPT | Mod: CPTII,S$GLB,, | Performed by: INTERNAL MEDICINE

## 2021-03-25 PROCEDURE — 1101F PR PT FALLS ASSESS DOC 0-1 FALLS W/OUT INJ PAST YR: ICD-10-PCS | Mod: CPTII,S$GLB,, | Performed by: INTERNAL MEDICINE

## 2021-03-25 PROCEDURE — 1126F PR PAIN SEVERITY QUANTIFIED, NO PAIN PRESENT: ICD-10-PCS | Mod: S$GLB,,, | Performed by: INTERNAL MEDICINE

## 2021-03-25 PROCEDURE — 3074F PR MOST RECENT SYSTOLIC BLOOD PRESSURE < 130 MM HG: ICD-10-PCS | Mod: CPTII,S$GLB,, | Performed by: INTERNAL MEDICINE

## 2021-03-25 PROCEDURE — 99204 PR OFFICE/OUTPT VISIT, NEW, LEVL IV, 45-59 MIN: ICD-10-PCS | Mod: S$GLB,,, | Performed by: INTERNAL MEDICINE

## 2021-03-25 RX ORDER — ONDANSETRON 4 MG/1
4 TABLET, ORALLY DISINTEGRATING ORAL
COMMUNITY

## 2021-03-25 RX ORDER — INSULIN DETEMIR 100 [IU]/ML
INJECTION, SOLUTION SUBCUTANEOUS
COMMUNITY
Start: 2021-03-11

## 2021-03-25 RX ORDER — NIACIN 1000 MG/1
1000 TABLET, EXTENDED RELEASE ORAL NIGHTLY
COMMUNITY
Start: 2021-02-20

## 2021-03-25 RX ORDER — CALCIUM CARBONATE 200(500)MG
1 TABLET,CHEWABLE ORAL
COMMUNITY

## 2021-03-25 RX ORDER — ASPIRIN 81 MG/1
81 TABLET ORAL DAILY
COMMUNITY

## 2021-08-17 ENCOUNTER — PATIENT MESSAGE (OUTPATIENT)
Dept: NEPHROLOGY | Facility: CLINIC | Age: 66
End: 2021-08-17

## 2021-08-27 LAB
BASOPHILS # BLD AUTO: 47 CELLS/UL (ref 0–200)
BASOPHILS NFR BLD AUTO: 0.8 %
EOSINOPHIL # BLD AUTO: 171 CELLS/UL (ref 15–500)
EOSINOPHIL NFR BLD AUTO: 2.9 %
ERYTHROCYTE [DISTWIDTH] IN BLOOD BY AUTOMATED COUNT: 14.8 % (ref 11–15)
HCT VFR BLD AUTO: 43.9 % (ref 35–45)
HGB BLD-MCNC: 14.5 G/DL (ref 11.7–15.5)
LYMPHOCYTES # BLD AUTO: 1711 CELLS/UL (ref 850–3900)
LYMPHOCYTES NFR BLD AUTO: 29 %
MAGNESIUM SERPL-MCNC: 1.2 MG/DL (ref 1.5–2.5)
MCH RBC QN AUTO: 30.1 PG (ref 27–33)
MCHC RBC AUTO-ENTMCNC: 33 G/DL (ref 32–36)
MCV RBC AUTO: 91.3 FL (ref 80–100)
MONOCYTES # BLD AUTO: 525 CELLS/UL (ref 200–950)
MONOCYTES NFR BLD AUTO: 8.9 %
NEUTROPHILS # BLD AUTO: 3446 CELLS/UL (ref 1500–7800)
NEUTROPHILS NFR BLD AUTO: 58.4 %
PHOSPHATE SERPL-MCNC: 2.7 MG/DL (ref 2.1–4.3)
PLATELET # BLD AUTO: 326 THOUSAND/UL (ref 140–400)
PMV BLD REES-ECKER: 10.6 FL (ref 7.5–12.5)
RBC # BLD AUTO: 4.81 MILLION/UL (ref 3.8–5.1)
WBC # BLD AUTO: 5.9 THOUSAND/UL (ref 3.8–10.8)

## 2021-09-08 ENCOUNTER — PATIENT MESSAGE (OUTPATIENT)
Dept: NEPHROLOGY | Facility: CLINIC | Age: 66
End: 2021-09-08

## 2021-09-16 ENCOUNTER — PATIENT MESSAGE (OUTPATIENT)
Dept: NEPHROLOGY | Facility: CLINIC | Age: 66
End: 2021-09-16

## 2021-09-17 ENCOUNTER — TELEPHONE (OUTPATIENT)
Dept: NEPHROLOGY | Facility: CLINIC | Age: 66
End: 2021-09-17

## 2021-09-28 ENCOUNTER — OFFICE VISIT (OUTPATIENT)
Dept: NEPHROLOGY | Facility: CLINIC | Age: 66
End: 2021-09-28
Payer: MEDICARE

## 2021-09-28 VITALS
WEIGHT: 125.88 LBS | DIASTOLIC BLOOD PRESSURE: 63 MMHG | RESPIRATION RATE: 18 BRPM | HEART RATE: 70 BPM | BODY MASS INDEX: 24.71 KG/M2 | TEMPERATURE: 98 F | HEIGHT: 60 IN | SYSTOLIC BLOOD PRESSURE: 136 MMHG

## 2021-09-28 DIAGNOSIS — I10 ESSENTIAL HYPERTENSION: Primary | ICD-10-CM

## 2021-09-28 DIAGNOSIS — Z79.4 TYPE 2 DIABETES MELLITUS WITHOUT COMPLICATION, WITH LONG-TERM CURRENT USE OF INSULIN: ICD-10-CM

## 2021-09-28 DIAGNOSIS — E83.42 HYPOMAGNESEMIA: ICD-10-CM

## 2021-09-28 DIAGNOSIS — E11.9 TYPE 2 DIABETES MELLITUS WITHOUT COMPLICATION, WITH LONG-TERM CURRENT USE OF INSULIN: ICD-10-CM

## 2021-09-28 DIAGNOSIS — N18.31 STAGE 3A CHRONIC KIDNEY DISEASE: ICD-10-CM

## 2021-09-28 PROCEDURE — 3288F PR FALLS RISK ASSESSMENT DOCUMENTED: ICD-10-PCS | Mod: CPTII,S$GLB,, | Performed by: INTERNAL MEDICINE

## 2021-09-28 PROCEDURE — 3008F BODY MASS INDEX DOCD: CPT | Mod: CPTII,S$GLB,, | Performed by: INTERNAL MEDICINE

## 2021-09-28 PROCEDURE — 1126F PR PAIN SEVERITY QUANTIFIED, NO PAIN PRESENT: ICD-10-PCS | Mod: CPTII,S$GLB,, | Performed by: INTERNAL MEDICINE

## 2021-09-28 PROCEDURE — 3078F PR MOST RECENT DIASTOLIC BLOOD PRESSURE < 80 MM HG: ICD-10-PCS | Mod: CPTII,S$GLB,, | Performed by: INTERNAL MEDICINE

## 2021-09-28 PROCEDURE — 3066F PR DOCUMENTATION OF TREATMENT FOR NEPHROPATHY: ICD-10-PCS | Mod: CPTII,S$GLB,, | Performed by: INTERNAL MEDICINE

## 2021-09-28 PROCEDURE — 1159F PR MEDICATION LIST DOCUMENTED IN MEDICAL RECORD: ICD-10-PCS | Mod: CPTII,S$GLB,, | Performed by: INTERNAL MEDICINE

## 2021-09-28 PROCEDURE — 4010F ACE/ARB THERAPY RXD/TAKEN: CPT | Mod: CPTII,S$GLB,, | Performed by: INTERNAL MEDICINE

## 2021-09-28 PROCEDURE — 3075F PR MOST RECENT SYSTOLIC BLOOD PRESS GE 130-139MM HG: ICD-10-PCS | Mod: CPTII,S$GLB,, | Performed by: INTERNAL MEDICINE

## 2021-09-28 PROCEDURE — 99999 PR PBB SHADOW E&M-EST. PATIENT-LVL IV: CPT | Mod: PBBFAC,,, | Performed by: INTERNAL MEDICINE

## 2021-09-28 PROCEDURE — 1159F MED LIST DOCD IN RCRD: CPT | Mod: CPTII,S$GLB,, | Performed by: INTERNAL MEDICINE

## 2021-09-28 PROCEDURE — 99214 PR OFFICE/OUTPT VISIT, EST, LEVL IV, 30-39 MIN: ICD-10-PCS | Mod: S$GLB,,, | Performed by: INTERNAL MEDICINE

## 2021-09-28 PROCEDURE — 3075F SYST BP GE 130 - 139MM HG: CPT | Mod: CPTII,S$GLB,, | Performed by: INTERNAL MEDICINE

## 2021-09-28 PROCEDURE — 1101F PT FALLS ASSESS-DOCD LE1/YR: CPT | Mod: CPTII,S$GLB,, | Performed by: INTERNAL MEDICINE

## 2021-09-28 PROCEDURE — 3066F NEPHROPATHY DOC TX: CPT | Mod: CPTII,S$GLB,, | Performed by: INTERNAL MEDICINE

## 2021-09-28 PROCEDURE — 3288F FALL RISK ASSESSMENT DOCD: CPT | Mod: CPTII,S$GLB,, | Performed by: INTERNAL MEDICINE

## 2021-09-28 PROCEDURE — 99214 OFFICE O/P EST MOD 30 MIN: CPT | Mod: S$GLB,,, | Performed by: INTERNAL MEDICINE

## 2021-09-28 PROCEDURE — 3008F PR BODY MASS INDEX (BMI) DOCUMENTED: ICD-10-PCS | Mod: CPTII,S$GLB,, | Performed by: INTERNAL MEDICINE

## 2021-09-28 PROCEDURE — 1101F PR PT FALLS ASSESS DOC 0-1 FALLS W/OUT INJ PAST YR: ICD-10-PCS | Mod: CPTII,S$GLB,, | Performed by: INTERNAL MEDICINE

## 2021-09-28 PROCEDURE — 99999 PR PBB SHADOW E&M-EST. PATIENT-LVL IV: ICD-10-PCS | Mod: PBBFAC,,, | Performed by: INTERNAL MEDICINE

## 2021-09-28 PROCEDURE — 3078F DIAST BP <80 MM HG: CPT | Mod: CPTII,S$GLB,, | Performed by: INTERNAL MEDICINE

## 2021-09-28 PROCEDURE — 1126F AMNT PAIN NOTED NONE PRSNT: CPT | Mod: CPTII,S$GLB,, | Performed by: INTERNAL MEDICINE

## 2021-09-28 PROCEDURE — 4010F PR ACE/ARB THEARPY RXD/TAKEN: ICD-10-PCS | Mod: CPTII,S$GLB,, | Performed by: INTERNAL MEDICINE

## 2021-09-28 RX ORDER — LANOLIN ALCOHOL/MO/W.PET/CERES
400 CREAM (GRAM) TOPICAL 2 TIMES DAILY
Qty: 60 TABLET | Refills: 6 | Status: SHIPPED | OUTPATIENT
Start: 2021-09-28

## 2021-09-29 ENCOUNTER — LAB VISIT (OUTPATIENT)
Dept: LAB | Facility: HOSPITAL | Age: 66
End: 2021-09-29
Attending: INTERNAL MEDICINE
Payer: MEDICARE

## 2021-09-29 DIAGNOSIS — I10 ESSENTIAL HYPERTENSION: ICD-10-CM

## 2021-09-29 DIAGNOSIS — Z79.4 TYPE 2 DIABETES MELLITUS WITHOUT COMPLICATION, WITH LONG-TERM CURRENT USE OF INSULIN: ICD-10-CM

## 2021-09-29 DIAGNOSIS — E11.9 TYPE 2 DIABETES MELLITUS WITHOUT COMPLICATION, WITH LONG-TERM CURRENT USE OF INSULIN: ICD-10-CM

## 2021-09-29 DIAGNOSIS — E83.42 HYPOMAGNESEMIA: ICD-10-CM

## 2021-09-29 DIAGNOSIS — N18.31 STAGE 3A CHRONIC KIDNEY DISEASE: ICD-10-CM

## 2021-09-29 LAB
ALBUMIN/CREAT UR: 252.2 UG/MG (ref 0–30)
BACTERIA #/AREA URNS HPF: ABNORMAL /HPF
BILIRUB UR QL STRIP: NEGATIVE
CLARITY UR: CLEAR
COLOR UR: COLORLESS
CREAT UR-MCNC: 23 MG/DL (ref 15–325)
GLUCOSE UR QL STRIP: NEGATIVE
HGB UR QL STRIP: NEGATIVE
HYALINE CASTS #/AREA URNS LPF: 1 /LPF
KETONES UR QL STRIP: NEGATIVE
LEUKOCYTE ESTERASE UR QL STRIP: ABNORMAL
MICROALBUMIN UR DL<=1MG/L-MCNC: 58 UG/ML
MICROSCOPIC COMMENT: ABNORMAL
NITRITE UR QL STRIP: NEGATIVE
PH UR STRIP: 7 [PH] (ref 5–8)
PROT UR QL STRIP: NEGATIVE
RBC #/AREA URNS HPF: 0 /HPF (ref 0–4)
SP GR UR STRIP: 1 (ref 1–1.03)
SQUAMOUS #/AREA URNS HPF: 1 /HPF
URN SPEC COLLECT METH UR: ABNORMAL
UROBILINOGEN UR STRIP-ACNC: NEGATIVE EU/DL
WBC #/AREA URNS HPF: 10 /HPF (ref 0–5)

## 2021-09-29 PROCEDURE — 81000 URINALYSIS NONAUTO W/SCOPE: CPT | Performed by: INTERNAL MEDICINE

## 2021-09-29 PROCEDURE — 82570 ASSAY OF URINE CREATININE: CPT | Performed by: INTERNAL MEDICINE

## 2021-10-26 ENCOUNTER — PATIENT MESSAGE (OUTPATIENT)
Dept: NEPHROLOGY | Facility: CLINIC | Age: 66
End: 2021-10-26
Payer: MEDICARE

## 2022-01-04 ENCOUNTER — PATIENT MESSAGE (OUTPATIENT)
Dept: NEPHROLOGY | Facility: CLINIC | Age: 67
End: 2022-01-04
Payer: MEDICARE

## 2022-01-04 ENCOUNTER — TELEPHONE (OUTPATIENT)
Dept: NEPHROLOGY | Facility: CLINIC | Age: 67
End: 2022-01-04
Payer: MEDICARE

## 2022-01-04 DIAGNOSIS — E78.5 HYPERLIPIDEMIA, UNSPECIFIED HYPERLIPIDEMIA TYPE: ICD-10-CM

## 2022-01-04 DIAGNOSIS — I10 ESSENTIAL HYPERTENSION: Primary | ICD-10-CM

## 2022-01-04 DIAGNOSIS — E83.42 HYPOMAGNESEMIA: ICD-10-CM

## 2022-01-04 DIAGNOSIS — E11.9 TYPE 2 DIABETES MELLITUS WITHOUT COMPLICATION, WITH LONG-TERM CURRENT USE OF INSULIN: ICD-10-CM

## 2022-01-04 DIAGNOSIS — Z79.4 TYPE 2 DIABETES MELLITUS WITHOUT COMPLICATION, WITH LONG-TERM CURRENT USE OF INSULIN: ICD-10-CM

## 2022-01-04 DIAGNOSIS — N18.31 STAGE 3A CHRONIC KIDNEY DISEASE: ICD-10-CM

## 2022-01-21 LAB
ALBUMIN SERPL-MCNC: 3.2 G/DL (ref 3.6–5.1)
ALBUMIN/GLOB SERPL: 1.3 (CALC) (ref 1–2.5)
ALP SERPL-CCNC: 51 U/L (ref 37–153)
ALT SERPL-CCNC: 8 U/L (ref 6–29)
AST SERPL-CCNC: 13 U/L (ref 10–35)
BASOPHILS # BLD AUTO: 70 CELLS/UL (ref 0–200)
BASOPHILS NFR BLD AUTO: 0.9 %
BILIRUB SERPL-MCNC: 0.4 MG/DL (ref 0.2–1.2)
BUN SERPL-MCNC: 13 MG/DL (ref 7–25)
BUN/CREAT SERPL: ABNORMAL (CALC) (ref 6–22)
CALCIUM SERPL-MCNC: 9.1 MG/DL (ref 8.6–10.4)
CHLORIDE SERPL-SCNC: 96 MMOL/L (ref 98–110)
CO2 SERPL-SCNC: 26 MMOL/L (ref 20–32)
CREAT SERPL-MCNC: 0.85 MG/DL (ref 0.5–0.99)
CREAT UR-MCNC: 90 MG/DL (ref 20–275)
EOSINOPHIL # BLD AUTO: 172 CELLS/UL (ref 15–500)
EOSINOPHIL NFR BLD AUTO: 2.2 %
ERYTHROCYTE [DISTWIDTH] IN BLOOD BY AUTOMATED COUNT: 12.8 % (ref 11–15)
GLOBULIN SER CALC-MCNC: 2.5 G/DL (CALC) (ref 1.9–3.7)
GLUCOSE SERPL-MCNC: 235 MG/DL (ref 65–99)
HCT VFR BLD AUTO: 41 % (ref 35–45)
HGB BLD-MCNC: 13.5 G/DL (ref 11.7–15.5)
LYMPHOCYTES # BLD AUTO: 1318 CELLS/UL (ref 850–3900)
LYMPHOCYTES NFR BLD AUTO: 16.9 %
MAGNESIUM SERPL-MCNC: 1.2 MG/DL (ref 1.5–2.5)
MCH RBC QN AUTO: 29.7 PG (ref 27–33)
MCHC RBC AUTO-ENTMCNC: 32.9 G/DL (ref 32–36)
MCV RBC AUTO: 90.1 FL (ref 80–100)
MONOCYTES # BLD AUTO: 647 CELLS/UL (ref 200–950)
MONOCYTES NFR BLD AUTO: 8.3 %
NEUTROPHILS # BLD AUTO: 5593 CELLS/UL (ref 1500–7800)
NEUTROPHILS NFR BLD AUTO: 71.7 %
PHOSPHATE SERPL-MCNC: 2.6 MG/DL (ref 2.1–4.3)
PLATELET # BLD AUTO: 380 THOUSAND/UL (ref 140–400)
PMV BLD REES-ECKER: 10.6 FL (ref 7.5–12.5)
POTASSIUM SERPL-SCNC: 3.9 MMOL/L (ref 3.5–5.3)
PROT SERPL-MCNC: 5.7 G/DL (ref 6.1–8.1)
PROT UR-MCNC: 6 MG/DL (ref 5–24)
PROT/CREAT UR: 0.07 MG/MG CREAT (ref 0.02–0.16)
PROT/CREAT UR: 67 MG/G CREAT (ref 21–161)
RBC # BLD AUTO: 4.55 MILLION/UL (ref 3.8–5.1)
SODIUM SERPL-SCNC: 134 MMOL/L (ref 135–146)
URATE SERPL-MCNC: 7.6 MG/DL (ref 2.5–7)
WBC # BLD AUTO: 7.8 THOUSAND/UL (ref 3.8–10.8)

## 2022-01-22 LAB
APPEARANCE UR: CLEAR
BACTERIA #/AREA URNS HPF: ABNORMAL /HPF
BACTERIA UR CULT: ABNORMAL
BILIRUB UR QL STRIP: NEGATIVE
COLOR UR: YELLOW
GLUCOSE UR QL STRIP: NEGATIVE
HGB UR QL STRIP: NEGATIVE
HYALINE CASTS #/AREA URNS LPF: ABNORMAL /LPF
KETONES UR QL STRIP: NEGATIVE
LEUKOCYTE ESTERASE UR QL STRIP: ABNORMAL
NITRITE UR QL STRIP: POSITIVE
PH UR STRIP: ABNORMAL [PH] (ref 5–8)
PROT UR QL STRIP: NEGATIVE
RBC #/AREA URNS HPF: ABNORMAL /HPF
SP GR UR STRIP: 1.01 (ref 1–1.03)
SQUAMOUS #/AREA URNS HPF: ABNORMAL /HPF
WBC #/AREA URNS HPF: ABNORMAL /HPF

## 2022-01-27 ENCOUNTER — OFFICE VISIT (OUTPATIENT)
Dept: NEPHROLOGY | Facility: CLINIC | Age: 67
End: 2022-01-27
Payer: MEDICARE

## 2022-01-27 VITALS
HEART RATE: 76 BPM | BODY MASS INDEX: 24.94 KG/M2 | RESPIRATION RATE: 18 BRPM | WEIGHT: 127 LBS | HEIGHT: 60 IN | SYSTOLIC BLOOD PRESSURE: 116 MMHG | DIASTOLIC BLOOD PRESSURE: 71 MMHG

## 2022-01-27 DIAGNOSIS — I10 ESSENTIAL HYPERTENSION: Primary | ICD-10-CM

## 2022-01-27 DIAGNOSIS — E11.9 TYPE 2 DIABETES MELLITUS WITHOUT COMPLICATION, WITH LONG-TERM CURRENT USE OF INSULIN: ICD-10-CM

## 2022-01-27 DIAGNOSIS — Z79.4 TYPE 2 DIABETES MELLITUS WITHOUT COMPLICATION, WITH LONG-TERM CURRENT USE OF INSULIN: ICD-10-CM

## 2022-01-27 DIAGNOSIS — E03.9 HYPOTHYROIDISM, UNSPECIFIED TYPE: ICD-10-CM

## 2022-01-27 DIAGNOSIS — N18.4 CKD (CHRONIC KIDNEY DISEASE) STAGE 4, GFR 15-29 ML/MIN: ICD-10-CM

## 2022-01-27 PROCEDURE — 1126F PR PAIN SEVERITY QUANTIFIED, NO PAIN PRESENT: ICD-10-PCS | Mod: CPTII,S$GLB,, | Performed by: INTERNAL MEDICINE

## 2022-01-27 PROCEDURE — 3066F PR DOCUMENTATION OF TREATMENT FOR NEPHROPATHY: ICD-10-PCS | Mod: CPTII,S$GLB,, | Performed by: INTERNAL MEDICINE

## 2022-01-27 PROCEDURE — 99999 PR PBB SHADOW E&M-EST. PATIENT-LVL III: CPT | Mod: PBBFAC,,, | Performed by: INTERNAL MEDICINE

## 2022-01-27 PROCEDURE — 99999 PR PBB SHADOW E&M-EST. PATIENT-LVL III: ICD-10-PCS | Mod: PBBFAC,,, | Performed by: INTERNAL MEDICINE

## 2022-01-27 PROCEDURE — 1101F PR PT FALLS ASSESS DOC 0-1 FALLS W/OUT INJ PAST YR: ICD-10-PCS | Mod: CPTII,S$GLB,, | Performed by: INTERNAL MEDICINE

## 2022-01-27 PROCEDURE — 1101F PT FALLS ASSESS-DOCD LE1/YR: CPT | Mod: CPTII,S$GLB,, | Performed by: INTERNAL MEDICINE

## 2022-01-27 PROCEDURE — 3008F BODY MASS INDEX DOCD: CPT | Mod: CPTII,S$GLB,, | Performed by: INTERNAL MEDICINE

## 2022-01-27 PROCEDURE — 3288F FALL RISK ASSESSMENT DOCD: CPT | Mod: CPTII,S$GLB,, | Performed by: INTERNAL MEDICINE

## 2022-01-27 PROCEDURE — 3074F SYST BP LT 130 MM HG: CPT | Mod: CPTII,S$GLB,, | Performed by: INTERNAL MEDICINE

## 2022-01-27 PROCEDURE — 3008F PR BODY MASS INDEX (BMI) DOCUMENTED: ICD-10-PCS | Mod: CPTII,S$GLB,, | Performed by: INTERNAL MEDICINE

## 2022-01-27 PROCEDURE — 3288F PR FALLS RISK ASSESSMENT DOCUMENTED: ICD-10-PCS | Mod: CPTII,S$GLB,, | Performed by: INTERNAL MEDICINE

## 2022-01-27 PROCEDURE — 99213 PR OFFICE/OUTPT VISIT, EST, LEVL III, 20-29 MIN: ICD-10-PCS | Mod: S$GLB,,, | Performed by: INTERNAL MEDICINE

## 2022-01-27 PROCEDURE — 1126F AMNT PAIN NOTED NONE PRSNT: CPT | Mod: CPTII,S$GLB,, | Performed by: INTERNAL MEDICINE

## 2022-01-27 PROCEDURE — 3078F PR MOST RECENT DIASTOLIC BLOOD PRESSURE < 80 MM HG: ICD-10-PCS | Mod: CPTII,S$GLB,, | Performed by: INTERNAL MEDICINE

## 2022-01-27 PROCEDURE — 3074F PR MOST RECENT SYSTOLIC BLOOD PRESSURE < 130 MM HG: ICD-10-PCS | Mod: CPTII,S$GLB,, | Performed by: INTERNAL MEDICINE

## 2022-01-27 PROCEDURE — 3066F NEPHROPATHY DOC TX: CPT | Mod: CPTII,S$GLB,, | Performed by: INTERNAL MEDICINE

## 2022-01-27 PROCEDURE — 99213 OFFICE O/P EST LOW 20 MIN: CPT | Mod: S$GLB,,, | Performed by: INTERNAL MEDICINE

## 2022-01-27 PROCEDURE — 3078F DIAST BP <80 MM HG: CPT | Mod: CPTII,S$GLB,, | Performed by: INTERNAL MEDICINE

## 2022-01-27 RX ORDER — CIPROFLOXACIN 500 MG/1
500 TABLET ORAL 2 TIMES DAILY
Qty: 14 TABLET | Refills: 0 | Status: SHIPPED | OUTPATIENT
Start: 2022-01-27

## 2022-01-27 NOTE — PROGRESS NOTES
Name: Karen Leone  Medical Record Number: [id number]  Date of Service: 01/27/2022  Note By: Antoni Aleman,     [facility]    Reason for Consultation: CKD 3a  Referring Provider: Dr. Gonzalez     History of Present Illness:  Karen Leone is a 65 y.o. female with past medical history of CKD 3/CKD2 who presents for Follow up. The patient denies CP, SOB, N/V, C/F. No foam, blood, or burning on urination. No cough, LOT/LOS/FABRIZIO.  Sts got Pfizer booster shot this past week. Sts she checks BP at home and usually it is under 130 mm Hg systolic. FBS this morning was 123. Sts has continuous BS monitoring now.    Past Medical History:  Past Medical History:   Diagnosis Date    Diabetes mellitus, type 2     Diverticulosis     Hyperlipidemia     Hypertension        Past Surgical History:  Past Surgical History:   Procedure Laterality Date    EYE SURGERY      TONSILLECTOMY         Family History:  Family History   Problem Relation Age of Onset    Diabetes Mother        Social History:  Social History     Socioeconomic History    Marital status: Single   Tobacco Use    Smoking status: Former Smoker    Smokeless tobacco: Never Used   Substance and Sexual Activity    Alcohol use: Yes     Alcohol/week: 15.0 standard drinks     Types: 15 Standard drinks or equivalent per week    Drug use: No    Sexual activity: Not Currently       Home Medications:   (Not in a hospital admission)      Allergies:  Keflex [cephalexin] and Metformin    Review of Systems:  10 point review of systems was conducted and was negative except as mentioned in the HPI.    Physical Exam:  Vitals:  Vitals:    01/27/22 1457   BP: 116/71   Pulse: 76   Resp: 18     [unfilled]      Exam  Exam  General: No acute distress, well groomed, alert and oriented x 3  HEENT: Normocephalic, atraumatic, EOM's intact bilaterally, external inspection of ears and nose normal, moist mucous membranes, no oral ulcerations/lesions  Neck: Supple, symmetrical,  trachea midline, no thyromegaly, no JVD  Respiratory: Clear to auscultation bilaterally, respirations unlabored, no rales/rhonchi/wheezing  Cardiovacular: Regular rate and rhythm, S1, S2 normal, no murmurs, rubs or gallops  Gastrointestinal: Soft, non-tender, bowel sounds normal, no hepatosplenomegaly  Musculoskeletal: No knee or ankle joint tenderness or swelling.   Extremities: No clubbing or cyanosis of bilateral upper extremities; no lower extremity edema bilaterally, radial pulses 2+ bilaterally, symmetric  Skin: warm and dry; no rash on exposed skin  Neurologic: CN grossly intact. No asterixis.    Labs:  A1C:  Recent Labs   Lab 02/14/19  1526   Hemoglobin A1C 7.3 H     Dec 28, 21 HbA1c 7.0    CBC:  Recent Labs   Lab 08/26/21  0658 08/26/21  0658 09/29/21  0753 09/29/21  0753 01/20/22  0752   WBC 5.9   < > 4.40   < > 7.8   RBC 4.81   < > 5.10   < > 4.55   Hemoglobin 14.5   < > 15.2   < > 13.5   Hematocrit 43.9   < > 46.1   < > 41.0   Platelets 326   < > 274   < > 380   MCV 91.3   < > 90   < > 90.1   MCH 30.1   < > 29.8   < > 29.7   MCHC 33.0  --  33.0  --  32.9    < > = values in this interval not displayed.     CMP:  Recent Labs   Lab 09/29/21  0753 09/29/21  0753 01/20/22  0749   Glucose 168 H   < > 235 H   Calcium 10.9 H   < > 9.1   Albumin 4.0   < > 3.2 L   Total Protein 7.9   < > 5.7 L   Sodium 134 L   < > 134 L   Potassium 4.5   < > 3.9   CO2 25   < > 26   Chloride 97   < > 96 L   BUN 16   < > 13   Creatinine 0.9   < > 0.85   Alkaline Phosphatase 87  --   --    ALT 24   < > 8   AST 43 H   < > 13   Total Bilirubin 0.6   < > 0.4   eGFR if African American >60   < > 82    < > = values in this interval not displayed.     LIPIDS:  Recent Labs   Lab 02/14/19  1144   HDL 61   Cholesterol 140   Triglycerides 146   LDL Cholesterol 49.8 L   HDL/Cholesterol Ratio 43.6   Non-HDL Cholesterol 79   Total Cholesterol/HDL Ratio 2.3     TSH:      URINALYSIS:  Recent Labs   Lab Result Units 01/20/22  0744   Color, UA   YELLOW   Specific Gravity, UA  1.009   pH, UA  < OR = 5.0   Protein, UA  NEGATIVE   Glucose, UA  NEGATIVE   Bacteria, UA /HPF MANY*   Nitrite, UA  POSITIVE*   Leukocytes, UA  TRACE*   Hyaline Casts, UA /LPF NONE SEEN        Lab Results   Component Value Date    WBC 7.8 01/20/2022    HGB 13.5 01/20/2022    HCT 41.0 01/20/2022     (L) 01/20/2022    K 3.9 01/20/2022    CL 96 (L) 01/20/2022    CO2 26 01/20/2022    BUN 13 01/20/2022    CREATININE 0.85 01/20/2022    EGFRNONAA 70 01/20/2022    CALCIUM 9.1 01/20/2022    PHOS 2.9 09/29/2021    MG 1.2 (L) 01/20/2022    ALBUMIN 3.2 (L) 01/20/2022    AST 13 01/20/2022    ALT 8 01/20/2022       No results found for: EXTANC, EXTWBC, EXTSEGS, EXTPLATELETS, EXTHEMOGLOBI, EXTHEMATOCRI, EXTCREATININ, EXTSODIUM, EXTPOTASSIUM, EXTBUN, EXTCO2, EXTCALCIUM, EXTPHOSPHORU, EXTGLUCOSE, EXTALBUMIN, EXTAST, EXTALT, EXTBILITOTAL, EXTLIPASE, EXTAMYLASE    No results found for: EXTCYCLOSLVL, EXTSIROLIMUS, EXTTACROLVL, EXTPROTCRE, EXTPTHINTACT, EXTPROTEINUA, EXTWBCUA, EXTRBCUA    Imaging Studies:   Vibha Henderson MD  [phone] 2/15/2019      Narrative & Impression  EXAMINATION:  US RENAL ARTERY STENOSIS HYPERTEN (XPD)     CLINICAL HISTORY:  rule out JEVON;  Hypertensive urgency     TECHNIQUE:  Grayscale, duplex and color flow Doppler evaluation of the kidneys performed bilaterally.     COMPARISON:  None     FINDINGS:  Kidneys are normal size.  Right kidney measures 10 cm and left kidney 10.6 cm.     There is mild right hydronephrosis.  There is no renal mass or calculus bilaterally.     Resistive indices within the right kidney range from 0.67-0.71.  Main renal vein is patent on the right.  Main renal artery is viewed in its entirety with no elevation in velocity to indicate renal artery stenosis.     Left kidney resistive indices range from 0.68-0.72, within the normal range.  Main renal vein is patent on the left.  The main renal artery is viewed in its entirety with no  elevation in velocity to indicate renal artery stenosis.     Bladder is within normal limits.  There are bilateral ureteral jets.     IMPRESSION:      No finding to indicate renal artery stenosis.     Mild right hydronephrosis.        Electronically signed by: Vibha Henderson MD  Date:                                            02/15/2019  Time:                                           15:25             Exam Ended: 02/15/19 14:17 Last Resulted: 02/15/19 15:25             ?    Assessment/Plan:  66 y.o. female with:    1- CKD 2 - Renal function is much better than 2 years ago.  She is non-oliguric.     2. UTI - Cipro 500 mg bid X7 days.     3. DM 2 - Controlled with Levemir     4. Hyperlipidemia - On Atorvastatin 80 mg     5. Osteoporosis - On Prolia 2 X per year.     6. Hypomagnesemia - Chance Mag may be OK.  Sts MagOx was making her have diarrhea.    7. Hypertension - Well controlled with medications    8. Hypothyroid - Increase L-Thyroxin to 75 mcg.     RTC 4 months     Antoni Aleman DO  [facility]    Clearance: current BUN/Cr 13/0.85.        Antoni Aleman DO  [facility]

## 2022-05-27 DIAGNOSIS — E83.42 HYPOMAGNESEMIA: ICD-10-CM

## 2022-05-27 DIAGNOSIS — N18.4 CKD (CHRONIC KIDNEY DISEASE) STAGE 4, GFR 15-29 ML/MIN: ICD-10-CM

## 2022-05-27 DIAGNOSIS — E78.5 HYPERLIPIDEMIA, UNSPECIFIED HYPERLIPIDEMIA TYPE: ICD-10-CM

## 2022-05-27 DIAGNOSIS — E11.9 TYPE 2 DIABETES MELLITUS WITHOUT COMPLICATION, WITH LONG-TERM CURRENT USE OF INSULIN: ICD-10-CM

## 2022-05-27 DIAGNOSIS — I10 ESSENTIAL HYPERTENSION: Primary | ICD-10-CM

## 2022-05-27 DIAGNOSIS — Z79.4 TYPE 2 DIABETES MELLITUS WITHOUT COMPLICATION, WITH LONG-TERM CURRENT USE OF INSULIN: ICD-10-CM

## 2022-06-14 NOTE — PATIENT INSTRUCTIONS
Please drink plenty of fluids.  Please get plenty of rest.  Please return here or go to the Emergency Department for any concerns or worsening of condition.  If you were prescribed antibiotics, please take them to completion.  If you were given wait & see antibiotics, please wait 5-7 days before taking them, and only take them if your symptoms have worsened or not improved.  If you do begin taking the antibiotics, please take them to completion.  If you were prescribed a narcotic medication, do not drive or operate heavy equipment or machinery while taking these medications.  If you were given a steroid shot in the clinic and have also been given a prescription for a steroid such as Prednisone or a Medrol Dose Pack, please begin taking them tomorrow.  If you do not have Hypertension or any history of palpitations, it is ok to take over the counter Sudafed or Mucinex D or Allegra-D or Claritin-D or Zyrtec-D.  If you do take one of the above, it is ok to combine that with plain over the counter Mucinex or Allegra or Claritin or Zyrtec.  If for example you are taking Zyrtec -D, you can combine that with Mucinex, but not Mucinex-D.  If you are taking Mucinex-D, you can combine that with plain Allegra or Claritin or Zyrtec.   If you do have Hypertension or palpitations, it is safe to take Coricidin HBP for relief of sinus symptoms.  If not allergic, please take over the counter Tylenol (Acetaminophen) and/or Motrin (Ibuprofen) as directed for control of pain and/or fever.  Please follow up with your primary care doctor or specialist as needed.    If you  smoke, please stop smoking.   English

## 2022-06-21 DIAGNOSIS — N18.4 CKD (CHRONIC KIDNEY DISEASE) STAGE 4, GFR 15-29 ML/MIN: ICD-10-CM

## 2022-06-21 DIAGNOSIS — I10 ESSENTIAL HYPERTENSION: Primary | ICD-10-CM

## 2022-06-21 DIAGNOSIS — E11.9 TYPE 2 DIABETES MELLITUS WITHOUT COMPLICATION, WITH LONG-TERM CURRENT USE OF INSULIN: ICD-10-CM

## 2022-06-21 DIAGNOSIS — Z79.4 TYPE 2 DIABETES MELLITUS WITHOUT COMPLICATION, WITH LONG-TERM CURRENT USE OF INSULIN: ICD-10-CM

## 2022-06-25 LAB
ALBUMIN SERPL-MCNC: 3.7 G/DL (ref 3.6–5.1)
ALBUMIN/GLOB SERPL: 1.5 (CALC) (ref 1–2.5)
ALP SERPL-CCNC: 43 U/L (ref 37–153)
ALT SERPL-CCNC: 13 U/L (ref 6–29)
APPEARANCE UR: CLEAR
AST SERPL-CCNC: 15 U/L (ref 10–35)
BACTERIA #/AREA URNS HPF: ABNORMAL /HPF
BACTERIA UR CULT: ABNORMAL
BASOPHILS # BLD AUTO: 62 CELLS/UL (ref 0–200)
BASOPHILS NFR BLD AUTO: 1.3 %
BILIRUB SERPL-MCNC: 0.4 MG/DL (ref 0.2–1.2)
BILIRUB UR QL STRIP: NEGATIVE
BUN SERPL-MCNC: 24 MG/DL (ref 7–25)
BUN/CREAT SERPL: 20 (CALC) (ref 6–22)
CALCIUM SERPL-MCNC: 8 MG/DL (ref 8.6–10.4)
CHLORIDE SERPL-SCNC: 100 MMOL/L (ref 98–110)
CO2 SERPL-SCNC: 25 MMOL/L (ref 20–32)
COLOR UR: YELLOW
CREAT SERPL-MCNC: 1.22 MG/DL (ref 0.5–0.99)
CREAT UR-MCNC: 92 MG/DL (ref 20–275)
EOSINOPHIL # BLD AUTO: 82 CELLS/UL (ref 15–500)
EOSINOPHIL NFR BLD AUTO: 1.7 %
ERYTHROCYTE [DISTWIDTH] IN BLOOD BY AUTOMATED COUNT: 12.7 % (ref 11–15)
GLOBULIN SER CALC-MCNC: 2.5 G/DL (CALC) (ref 1.9–3.7)
GLUCOSE SERPL-MCNC: 70 MG/DL (ref 65–99)
GLUCOSE UR QL STRIP: NEGATIVE
HCT VFR BLD AUTO: 39.9 % (ref 35–45)
HGB BLD-MCNC: 13.1 G/DL (ref 11.7–15.5)
HGB UR QL STRIP: NEGATIVE
HYALINE CASTS #/AREA URNS LPF: ABNORMAL /LPF
KETONES UR QL STRIP: NEGATIVE
LEUKOCYTE ESTERASE UR QL STRIP: ABNORMAL
LYMPHOCYTES # BLD AUTO: 1651 CELLS/UL (ref 850–3900)
LYMPHOCYTES NFR BLD AUTO: 34.4 %
MAGNESIUM SERPL-MCNC: 2 MG/DL (ref 1.5–2.5)
MCH RBC QN AUTO: 30 PG (ref 27–33)
MCHC RBC AUTO-ENTMCNC: 32.8 G/DL (ref 32–36)
MCV RBC AUTO: 91.5 FL (ref 80–100)
MONOCYTES # BLD AUTO: 451 CELLS/UL (ref 200–950)
MONOCYTES NFR BLD AUTO: 9.4 %
NEUTROPHILS # BLD AUTO: 2554 CELLS/UL (ref 1500–7800)
NEUTROPHILS NFR BLD AUTO: 53.2 %
NITRITE UR QL STRIP: POSITIVE
PH UR STRIP: 5.5 [PH] (ref 5–8)
PHOSPHATE SERPL-MCNC: 2.2 MG/DL (ref 2.1–4.3)
PLATELET # BLD AUTO: 402 THOUSAND/UL (ref 140–400)
PMV BLD REES-ECKER: 9.5 FL (ref 7.5–12.5)
POTASSIUM SERPL-SCNC: 4.3 MMOL/L (ref 3.5–5.3)
PROT SERPL-MCNC: 6.2 G/DL (ref 6.1–8.1)
PROT UR QL STRIP: NEGATIVE
PROT UR-MCNC: 8 MG/DL (ref 5–24)
PROT/CREAT UR: 0.09 MG/MG CREAT (ref 0.02–0.16)
PROT/CREAT UR: 87 MG/G CREAT (ref 21–161)
RBC # BLD AUTO: 4.36 MILLION/UL (ref 3.8–5.1)
RBC #/AREA URNS HPF: ABNORMAL /HPF
SERVICE CMNT-IMP: ABNORMAL
SODIUM SERPL-SCNC: 134 MMOL/L (ref 135–146)
SP GR UR STRIP: 1.01 (ref 1–1.03)
SQUAMOUS #/AREA URNS HPF: ABNORMAL /HPF
URATE SERPL-MCNC: 9.2 MG/DL (ref 2.5–7)
WBC # BLD AUTO: 4.8 THOUSAND/UL (ref 3.8–10.8)
WBC #/AREA URNS HPF: ABNORMAL /HPF

## 2022-06-28 ENCOUNTER — OFFICE VISIT (OUTPATIENT)
Dept: NEPHROLOGY | Facility: CLINIC | Age: 67
End: 2022-06-28
Payer: MEDICARE

## 2022-06-28 DIAGNOSIS — Z79.4 TYPE 2 DIABETES MELLITUS WITHOUT COMPLICATION, WITH LONG-TERM CURRENT USE OF INSULIN: ICD-10-CM

## 2022-06-28 DIAGNOSIS — E11.9 TYPE 2 DIABETES MELLITUS WITHOUT COMPLICATION, WITH LONG-TERM CURRENT USE OF INSULIN: ICD-10-CM

## 2022-06-28 DIAGNOSIS — N18.31 STAGE 3A CHRONIC KIDNEY DISEASE: Primary | ICD-10-CM

## 2022-06-28 DIAGNOSIS — I10 ESSENTIAL HYPERTENSION: ICD-10-CM

## 2022-06-28 PROCEDURE — 1159F PR MEDICATION LIST DOCUMENTED IN MEDICAL RECORD: ICD-10-PCS | Mod: CPTII,S$GLB,, | Performed by: INTERNAL MEDICINE

## 2022-06-28 PROCEDURE — 1159F MED LIST DOCD IN RCRD: CPT | Mod: CPTII,S$GLB,, | Performed by: INTERNAL MEDICINE

## 2022-06-28 PROCEDURE — 4010F PR ACE/ARB THEARPY RXD/TAKEN: ICD-10-PCS | Mod: CPTII,S$GLB,, | Performed by: INTERNAL MEDICINE

## 2022-06-28 PROCEDURE — 3066F NEPHROPATHY DOC TX: CPT | Mod: CPTII,S$GLB,, | Performed by: INTERNAL MEDICINE

## 2022-06-28 PROCEDURE — 1160F PR REVIEW ALL MEDS BY PRESCRIBER/CLIN PHARMACIST DOCUMENTED: ICD-10-PCS | Mod: CPTII,S$GLB,, | Performed by: INTERNAL MEDICINE

## 2022-06-28 PROCEDURE — 1101F PR PT FALLS ASSESS DOC 0-1 FALLS W/OUT INJ PAST YR: ICD-10-PCS | Mod: CPTII,S$GLB,, | Performed by: INTERNAL MEDICINE

## 2022-06-28 PROCEDURE — 4010F ACE/ARB THERAPY RXD/TAKEN: CPT | Mod: CPTII,S$GLB,, | Performed by: INTERNAL MEDICINE

## 2022-06-28 PROCEDURE — 99999 PR PBB SHADOW E&M-EST. PATIENT-LVL III: CPT | Mod: PBBFAC,,, | Performed by: INTERNAL MEDICINE

## 2022-06-28 PROCEDURE — 3066F PR DOCUMENTATION OF TREATMENT FOR NEPHROPATHY: ICD-10-PCS | Mod: CPTII,S$GLB,, | Performed by: INTERNAL MEDICINE

## 2022-06-28 PROCEDURE — 3288F PR FALLS RISK ASSESSMENT DOCUMENTED: ICD-10-PCS | Mod: CPTII,S$GLB,, | Performed by: INTERNAL MEDICINE

## 2022-06-28 PROCEDURE — 99999 PR PBB SHADOW E&M-EST. PATIENT-LVL III: ICD-10-PCS | Mod: PBBFAC,,, | Performed by: INTERNAL MEDICINE

## 2022-06-28 PROCEDURE — 99213 OFFICE O/P EST LOW 20 MIN: CPT | Mod: S$GLB,,, | Performed by: INTERNAL MEDICINE

## 2022-06-28 PROCEDURE — 99213 PR OFFICE/OUTPT VISIT, EST, LEVL III, 20-29 MIN: ICD-10-PCS | Mod: S$GLB,,, | Performed by: INTERNAL MEDICINE

## 2022-06-28 PROCEDURE — 1101F PT FALLS ASSESS-DOCD LE1/YR: CPT | Mod: CPTII,S$GLB,, | Performed by: INTERNAL MEDICINE

## 2022-06-28 PROCEDURE — 3288F FALL RISK ASSESSMENT DOCD: CPT | Mod: CPTII,S$GLB,, | Performed by: INTERNAL MEDICINE

## 2022-06-28 PROCEDURE — 1160F RVW MEDS BY RX/DR IN RCRD: CPT | Mod: CPTII,S$GLB,, | Performed by: INTERNAL MEDICINE

## 2022-06-28 RX ORDER — METOPROLOL SUCCINATE 100 MG/1
100 TABLET, EXTENDED RELEASE ORAL DAILY
COMMUNITY
Start: 2022-06-20

## 2022-06-28 RX ORDER — LISINOPRIL 10 MG/1
10 TABLET ORAL DAILY
COMMUNITY
Start: 2022-06-20

## 2022-06-28 RX ORDER — LEVOTHYROXINE SODIUM 88 UG/1
88 TABLET ORAL DAILY
COMMUNITY
Start: 2022-04-19

## 2022-06-28 RX ORDER — AMLODIPINE BESYLATE 10 MG/1
10 TABLET ORAL DAILY
COMMUNITY
Start: 2022-04-05

## 2022-06-28 NOTE — PROGRESS NOTES
Name: Karen Leone  Medical Record Number: [id number]  Date of Service: 06/28/2022  Note By: Antoni Aleman,     [facility]    Reason for Consultation: CKD 3a  Referring Provider: Dr. Gonzalez     History of Present Illness:  Karen Leone is a 65 y.o. female with past medical history of CKD 3/CKD2 who presents for Follow up. The patient denies CP, SOB, N/V, C/F. No foam, blood, or burning on urination. No cough, LOT/LOS/FABRIZIO.  Sts has had 2 Pfizer boosters. Sts she checks BP at home and usually it is under 130 mm Hg systolic. FBS this morning was 123. Sts has continuous BS monitoring now. A1c is now 6.5.    Past Medical History:  Past Medical History:   Diagnosis Date    Diabetes mellitus, type 2     Diverticulosis     Hyperlipidemia     Hypertension        Past Surgical History:  Past Surgical History:   Procedure Laterality Date    EYE SURGERY      TONSILLECTOMY         Family History:  Family History   Problem Relation Age of Onset    Diabetes Mother        Social History:  Social History     Socioeconomic History    Marital status: Single   Tobacco Use    Smoking status: Former Smoker    Smokeless tobacco: Never Used   Substance and Sexual Activity    Alcohol use: Yes     Alcohol/week: 15.0 standard drinks     Types: 15 Standard drinks or equivalent per week    Drug use: No    Sexual activity: Not Currently       Home Medications:   (Not in a hospital admission)      Allergies:  Keflex [cephalexin] and Metformin    Review of Systems:  10 point review of systems was conducted and was negative except as mentioned in the HPI.    Physical Exam:  Vitals:  There were no vitals filed for this visit.  [unfilled]      Exam    General: No acute distress, well groomed, alert and oriented x 3  HEENT: Normocephalic, atraumatic, EOM's intact bilaterally, external inspection of ears and nose normal, moist mucous membranes, no oral ulcerations/lesions  Neck: Supple, symmetrical, trachea midline, no  thyromegaly, no JVD  Respiratory: Clear to auscultation bilaterally, respirations unlabored, no rales/rhonchi/wheezing  Cardiovacular: Regular rate and rhythm, S1, S2 normal, no murmurs, rubs or gallops  Gastrointestinal: Soft, non-tender, bowel sounds normal, no hepatosplenomegaly  Musculoskeletal: No knee or ankle joint tenderness or swelling.   Extremities: No clubbing or cyanosis of bilateral upper extremities; no lower extremity edema bilaterally, radial pulses 2+ bilaterally, symmetric  Skin: warm and dry; no rash on exposed skin  Neurologic: CN grossly intact. No asterixis.    Labs:  A1C:      CBC:  Recent Labs   Lab 09/29/21  0753 01/20/22  0752 06/22/22  0726   WBC 4.40 7.8 4.8   RBC 5.10 4.55 4.36   Hemoglobin 15.2 13.5 13.1   Hematocrit 46.1 41.0 39.9   Platelets 274 380 402 H   MCV 90 90.1 91.5   MCH 29.8 29.7 30.0   MCHC 33.0 32.9 32.8     CMP:  Recent Labs   Lab 09/29/21  0753 01/20/22  0749 06/22/22  0726   Glucose 168 H   < > 70   Calcium 10.9 H   < > 8.0 L   Albumin 4.0   < > 3.7   Total Protein 7.9   < > 6.2   Sodium 134 L   < > 134 L   Potassium 4.5   < > 4.3   CO2 25   < > 25   Chloride 97   < > 100   BUN 16   < > 24   Creatinine 0.9   < > 1.22 H   Alkaline Phosphatase 87  --   --    ALT 24   < > 13   AST 43 H   < > 15   Total Bilirubin 0.6   < > 0.4   eGFR if African American >60   < > 53 L    < > = values in this interval not displayed.     LIPIDS:      TSH:      URINALYSIS:  Recent Labs   Lab Result Units 06/22/22  0726   Color, UA  YELLOW   Specific Gravity, UA  1.010   pH, UA  5.5   Protein, UA  NEGATIVE   Glucose, UA  NEGATIVE   Bacteria, UA /HPF MANY*   Nitrite, UA  POSITIVE*   Leukocytes, UA  TRACE*   Hyaline Casts, UA /LPF NONE SEEN        Lab Results   Component Value Date    WBC 4.8 06/22/2022    HGB 13.1 06/22/2022    HCT 39.9 06/22/2022     (L) 06/22/2022    K 4.3 06/22/2022     06/22/2022    CO2 25 06/22/2022    BUN 24 06/22/2022    CREATININE 1.22 (H) 06/22/2022     EGFRNONAA 45 (L) 06/22/2022    CALCIUM 8.0 (L) 06/22/2022    PHOS 2.9 09/29/2021    MG 2.0 06/22/2022    ALBUMIN 3.7 06/22/2022    AST 15 06/22/2022    ALT 13 06/22/2022       No results found for: EXTANC, EXTWBC, EXTSEGS, EXTPLATELETS, EXTHEMOGLOBI, EXTHEMATOCRI, EXTCREATININ, EXTSODIUM, EXTPOTASSIUM, EXTBUN, EXTCO2, EXTCALCIUM, EXTPHOSPHORU, EXTGLUCOSE, EXTALBUMIN, EXTAST, EXTALT, EXTBILITOTAL, EXTLIPASE, EXTAMYLASE    No results found for: EXTCYCLOSLVL, EXTSIROLIMUS, EXTTACROLVL, EXTPROTCRE, EXTPTHINTACT, EXTPROTEINUA, EXTWBCUA, EXTRBCUA    Imaging Studies: n/a  ?    Assessment/Plan:  66 y.o. female with:     1- CKD 2 - Renal function is back to where it was 2 years ago with GFR of 45.  She is non-oliguric. Avoid getting dehydrated.      2. DM 2 - Controlled with Levemir. BG much better controlled with A1c of 65.     4. Hyperlipidemia - On Atorvastatin 80 mg     5. Osteoporosis - On Prolia 2 X per year.     6. Hypomagnesemia - Chance Mag may be OK.  Sts MagOx was making her have diarrhea.     7. Hypertension - Well controlled with medications     8. Hypothyroid - Increase L-Thyroxin to 88 mcg.     RTC 4 months     Antoni Aleman DO  [facility]     Clearance: current BUN/Cr 24/1.22           Antoni Aleman DO  [facility]

## 2022-10-04 ENCOUNTER — PATIENT MESSAGE (OUTPATIENT)
Dept: NEPHROLOGY | Facility: CLINIC | Age: 67
End: 2022-10-04
Payer: MEDICARE

## 2022-10-20 LAB
ALBUMIN SERPL-MCNC: 3.7 G/DL (ref 3.6–5.1)
ALBUMIN/GLOB SERPL: 1.3 (CALC) (ref 1–2.5)
ALP SERPL-CCNC: 70 U/L (ref 37–153)
ALT SERPL-CCNC: 7 U/L (ref 6–29)
APPEARANCE UR: CLEAR
AST SERPL-CCNC: 14 U/L (ref 10–35)
BACTERIA #/AREA URNS HPF: ABNORMAL /HPF
BACTERIA UR CULT: ABNORMAL
BASOPHILS # BLD AUTO: 31 CELLS/UL (ref 0–200)
BASOPHILS NFR BLD AUTO: 0.6 %
BILIRUB SERPL-MCNC: 0.6 MG/DL (ref 0.2–1.2)
BILIRUB UR QL STRIP: NEGATIVE
BUN SERPL-MCNC: 10 MG/DL (ref 7–25)
BUN/CREAT SERPL: ABNORMAL (CALC) (ref 6–22)
CALCIUM SERPL-MCNC: 9.8 MG/DL (ref 8.6–10.4)
CHLORIDE SERPL-SCNC: 92 MMOL/L (ref 98–110)
CO2 SERPL-SCNC: 29 MMOL/L (ref 20–32)
COLOR UR: YELLOW
CREAT SERPL-MCNC: 0.82 MG/DL (ref 0.5–1.05)
EGFR: 77 ML/MIN/1.73M2
EOSINOPHIL # BLD AUTO: 120 CELLS/UL (ref 15–500)
EOSINOPHIL NFR BLD AUTO: 2.3 %
ERYTHROCYTE [DISTWIDTH] IN BLOOD BY AUTOMATED COUNT: 15.7 % (ref 11–15)
GLOBULIN SER CALC-MCNC: 2.9 G/DL (CALC) (ref 1.9–3.7)
GLUCOSE SERPL-MCNC: 118 MG/DL (ref 65–99)
GLUCOSE UR QL STRIP: NEGATIVE
HCT VFR BLD AUTO: 37 % (ref 35–45)
HGB BLD-MCNC: 11.6 G/DL (ref 11.7–15.5)
HGB UR QL STRIP: NEGATIVE
HYALINE CASTS #/AREA URNS LPF: ABNORMAL /LPF
KETONES UR QL STRIP: ABNORMAL
LEUKOCYTE ESTERASE UR QL STRIP: NEGATIVE
LYMPHOCYTES # BLD AUTO: 1342 CELLS/UL (ref 850–3900)
LYMPHOCYTES NFR BLD AUTO: 25.8 %
MAGNESIUM SERPL-MCNC: 1.1 MG/DL (ref 1.5–2.5)
MCH RBC QN AUTO: 27.5 PG (ref 27–33)
MCHC RBC AUTO-ENTMCNC: 31.4 G/DL (ref 32–36)
MCV RBC AUTO: 87.7 FL (ref 80–100)
MONOCYTES # BLD AUTO: 556 CELLS/UL (ref 200–950)
MONOCYTES NFR BLD AUTO: 10.7 %
NEUTROPHILS # BLD AUTO: 3151 CELLS/UL (ref 1500–7800)
NEUTROPHILS NFR BLD AUTO: 60.6 %
NITRITE UR QL STRIP: NEGATIVE
PH UR STRIP: 5.5 [PH] (ref 5–8)
PHOSPHATE SERPL-MCNC: 2.4 MG/DL (ref 2.1–4.3)
PLATELET # BLD AUTO: 433 THOUSAND/UL (ref 140–400)
PMV BLD REES-ECKER: 9.8 FL (ref 7.5–12.5)
POTASSIUM SERPL-SCNC: 3.8 MMOL/L (ref 3.5–5.3)
PROT SERPL-MCNC: 6.6 G/DL (ref 6.1–8.1)
PROT UR QL STRIP: NEGATIVE
RBC # BLD AUTO: 4.22 MILLION/UL (ref 3.8–5.1)
RBC #/AREA URNS HPF: ABNORMAL /HPF
SERVICE CMNT-IMP: ABNORMAL
SODIUM SERPL-SCNC: 137 MMOL/L (ref 135–146)
SP GR UR STRIP: 1.01 (ref 1–1.03)
SQUAMOUS #/AREA URNS HPF: ABNORMAL /HPF
URATE SERPL-MCNC: 11.3 MG/DL (ref 2.5–7)
WBC # BLD AUTO: 5.2 THOUSAND/UL (ref 3.8–10.8)
WBC #/AREA URNS HPF: ABNORMAL /HPF

## 2023-02-27 ENCOUNTER — PATIENT MESSAGE (OUTPATIENT)
Dept: NEPHROLOGY | Facility: CLINIC | Age: 68
End: 2023-02-27
Payer: MEDICARE

## 2023-03-28 ENCOUNTER — PATIENT MESSAGE (OUTPATIENT)
Dept: NEPHROLOGY | Facility: CLINIC | Age: 68
End: 2023-03-28
Payer: MEDICARE

## 2023-03-28 DIAGNOSIS — E78.2 MIXED HYPERLIPIDEMIA: Primary | ICD-10-CM

## 2023-03-28 DIAGNOSIS — E83.42 HYPOMAGNESEMIA: ICD-10-CM

## 2023-03-28 DIAGNOSIS — N18.2 CKD STAGE G2/A2, GFR 60-89 AND ALBUMIN CREATININE RATIO 30-299 MG/G: ICD-10-CM

## 2023-03-28 DIAGNOSIS — E11.9 TYPE 2 DIABETES MELLITUS WITHOUT COMPLICATION, WITH LONG-TERM CURRENT USE OF INSULIN: ICD-10-CM

## 2023-03-28 DIAGNOSIS — Z79.4 TYPE 2 DIABETES MELLITUS WITHOUT COMPLICATION, WITH LONG-TERM CURRENT USE OF INSULIN: ICD-10-CM

## 2023-03-28 DIAGNOSIS — I10 ESSENTIAL HYPERTENSION: ICD-10-CM

## 2023-04-04 ENCOUNTER — OFFICE VISIT (OUTPATIENT)
Dept: NEPHROLOGY | Facility: CLINIC | Age: 68
End: 2023-04-04
Payer: MEDICARE

## 2023-04-04 VITALS
HEART RATE: 84 BPM | WEIGHT: 124.94 LBS | SYSTOLIC BLOOD PRESSURE: 133 MMHG | BODY MASS INDEX: 24.53 KG/M2 | DIASTOLIC BLOOD PRESSURE: 79 MMHG | HEIGHT: 60 IN

## 2023-04-04 DIAGNOSIS — I10 ESSENTIAL HYPERTENSION: ICD-10-CM

## 2023-04-04 DIAGNOSIS — N18.32 STAGE 3B CHRONIC KIDNEY DISEASE: Primary | ICD-10-CM

## 2023-04-04 DIAGNOSIS — Z79.4 TYPE 2 DIABETES MELLITUS WITHOUT COMPLICATION, WITH LONG-TERM CURRENT USE OF INSULIN: ICD-10-CM

## 2023-04-04 DIAGNOSIS — E03.9 HYPOTHYROIDISM, UNSPECIFIED TYPE: ICD-10-CM

## 2023-04-04 DIAGNOSIS — E11.9 TYPE 2 DIABETES MELLITUS WITHOUT COMPLICATION, WITH LONG-TERM CURRENT USE OF INSULIN: ICD-10-CM

## 2023-04-04 PROCEDURE — 3066F NEPHROPATHY DOC TX: CPT | Mod: CPTII,S$GLB,, | Performed by: INTERNAL MEDICINE

## 2023-04-04 PROCEDURE — 99214 PR OFFICE/OUTPT VISIT, EST, LEVL IV, 30-39 MIN: ICD-10-PCS | Mod: S$GLB,,, | Performed by: INTERNAL MEDICINE

## 2023-04-04 PROCEDURE — 1126F AMNT PAIN NOTED NONE PRSNT: CPT | Mod: CPTII,S$GLB,, | Performed by: INTERNAL MEDICINE

## 2023-04-04 PROCEDURE — 4010F PR ACE/ARB THEARPY RXD/TAKEN: ICD-10-PCS | Mod: CPTII,S$GLB,, | Performed by: INTERNAL MEDICINE

## 2023-04-04 PROCEDURE — 1126F PR PAIN SEVERITY QUANTIFIED, NO PAIN PRESENT: ICD-10-PCS | Mod: CPTII,S$GLB,, | Performed by: INTERNAL MEDICINE

## 2023-04-04 PROCEDURE — 3066F PR DOCUMENTATION OF TREATMENT FOR NEPHROPATHY: ICD-10-PCS | Mod: CPTII,S$GLB,, | Performed by: INTERNAL MEDICINE

## 2023-04-04 PROCEDURE — 3288F FALL RISK ASSESSMENT DOCD: CPT | Mod: CPTII,S$GLB,, | Performed by: INTERNAL MEDICINE

## 2023-04-04 PROCEDURE — 3008F BODY MASS INDEX DOCD: CPT | Mod: CPTII,S$GLB,, | Performed by: INTERNAL MEDICINE

## 2023-04-04 PROCEDURE — 99214 OFFICE O/P EST MOD 30 MIN: CPT | Mod: S$GLB,,, | Performed by: INTERNAL MEDICINE

## 2023-04-04 PROCEDURE — 4010F ACE/ARB THERAPY RXD/TAKEN: CPT | Mod: CPTII,S$GLB,, | Performed by: INTERNAL MEDICINE

## 2023-04-04 PROCEDURE — 3288F PR FALLS RISK ASSESSMENT DOCUMENTED: ICD-10-PCS | Mod: CPTII,S$GLB,, | Performed by: INTERNAL MEDICINE

## 2023-04-04 PROCEDURE — 3078F DIAST BP <80 MM HG: CPT | Mod: CPTII,S$GLB,, | Performed by: INTERNAL MEDICINE

## 2023-04-04 PROCEDURE — 3008F PR BODY MASS INDEX (BMI) DOCUMENTED: ICD-10-PCS | Mod: CPTII,S$GLB,, | Performed by: INTERNAL MEDICINE

## 2023-04-04 PROCEDURE — 1159F MED LIST DOCD IN RCRD: CPT | Mod: CPTII,S$GLB,, | Performed by: INTERNAL MEDICINE

## 2023-04-04 PROCEDURE — 1101F PT FALLS ASSESS-DOCD LE1/YR: CPT | Mod: CPTII,S$GLB,, | Performed by: INTERNAL MEDICINE

## 2023-04-04 PROCEDURE — 1101F PR PT FALLS ASSESS DOC 0-1 FALLS W/OUT INJ PAST YR: ICD-10-PCS | Mod: CPTII,S$GLB,, | Performed by: INTERNAL MEDICINE

## 2023-04-04 PROCEDURE — 1159F PR MEDICATION LIST DOCUMENTED IN MEDICAL RECORD: ICD-10-PCS | Mod: CPTII,S$GLB,, | Performed by: INTERNAL MEDICINE

## 2023-04-04 PROCEDURE — 99999 PR PBB SHADOW E&M-EST. PATIENT-LVL IV: ICD-10-PCS | Mod: PBBFAC,,, | Performed by: INTERNAL MEDICINE

## 2023-04-04 PROCEDURE — 99999 PR PBB SHADOW E&M-EST. PATIENT-LVL IV: CPT | Mod: PBBFAC,,, | Performed by: INTERNAL MEDICINE

## 2023-04-04 PROCEDURE — 3078F PR MOST RECENT DIASTOLIC BLOOD PRESSURE < 80 MM HG: ICD-10-PCS | Mod: CPTII,S$GLB,, | Performed by: INTERNAL MEDICINE

## 2023-04-04 PROCEDURE — 3075F SYST BP GE 130 - 139MM HG: CPT | Mod: CPTII,S$GLB,, | Performed by: INTERNAL MEDICINE

## 2023-04-04 PROCEDURE — 3075F PR MOST RECENT SYSTOLIC BLOOD PRESS GE 130-139MM HG: ICD-10-PCS | Mod: CPTII,S$GLB,, | Performed by: INTERNAL MEDICINE

## 2023-04-04 RX ORDER — INSULIN GLARGINE 100 [IU]/ML
18 INJECTION, SOLUTION SUBCUTANEOUS DAILY
COMMUNITY

## 2023-04-04 NOTE — PROGRESS NOTES
Name: Karen Leone  Medical Record Number: [id number]  Date of Service: 04/04/2023  Note By: Antoni Aleman,     [facility] Consult    Reason for Consultation: CKD 3a  Referring Provider: Dr. Gonzalez     History of Present Illness:  Karen Leone is a 65 y.o. female with past medical history of CKD 3/CKD2 who presents for Follow up. The patient denies CP, SOB, N/V, C/F. No foam, blood, or burning on urination. No cough, LOT/LOS/FABRIZIO.  Sts has had 2 Pfizer boosters. Sts she checks BP at home and usually it is under 130 mm Hg systolic. FBS this morning was 110. Sts has continuous BS monitoring now. A1c is now 6.  Sts had flu vaccine on October 11 and sts had the flu on [place] weekend..    History of CKD2  Nephrologist CHRIS Aleman  Access n/a?  Dialysis center n/a?    Past Medical History:  Past Medical History:   Diagnosis Date    Diabetes mellitus, type 2     Diverticulosis     Hyperlipidemia     Hypertension        Past Surgical History:  Past Surgical History:   Procedure Laterality Date    EYE SURGERY      TONSILLECTOMY         Family History:  Family History   Problem Relation Age of Onset    Diabetes Mother        Social History:  Social History     Socioeconomic History    Marital status: Single   Tobacco Use    Smoking status: Former    Smokeless tobacco: Never   Substance and Sexual Activity    Alcohol use: Yes     Alcohol/week: 15.0 standard drinks     Types: 15 Standard drinks or equivalent per week    Drug use: No    Sexual activity: Not Currently       Home Medications:   (Not in a hospital admission)      Allergies:  Keflex [cephalexin] and Metformin    Review of Systems:  10 point review of systems was conducted and was negative except as mentioned in the HPI.    Physical Exam:  Vitals:  Vitals:    04/04/23 1529   BP: 133/79   Pulse: 84     [unfilled]      Exam  General: No acute distress, well groomed, alert and oriented x 3  HEENT: Normocephalic, atraumatic, EOM's intact bilaterally, external  inspection of ears and nose normal, moist mucous membranes, no oral ulcerations/lesions  Neck: Supple, symmetrical, trachea midline, no thyromegaly, no JVD  Respiratory: Clear to auscultation bilaterally, respirations unlabored, no rales/rhonchi/wheezing  Cardiovacular: Regular rate and rhythm, S1, S2 normal, no murmurs, rubs or gallops  Gastrointestinal: Soft, non-tender, bowel sounds normal, no hepatosplenomegaly  Musculoskeletal: No knee or ankle joint tenderness or swelling.   Extremities: No clubbing or cyanosis of bilateral upper extremities; no lower extremity edema bilaterally, radial pulses 2+ bilaterally, symmetric  Skin: warm and dry; no rash on exposed skin  Neurologic: CN grossly intact    Labs:  A1C:      CBC:  Recent Labs   Lab 06/22/22  0726 10/19/22  0733 03/29/23  0619   WBC 4.8 5.2 4.8   RBC 4.36 4.22 4.13 L   Hemoglobin 13.1 11.6 L 12.5   Hematocrit 39.9 37.0 37.7   Platelets 402 H 433 H 315   MCV 91.5 87.7 91.4   MCH 30.0 27.5 30.2   MCHC 32.8 31.4 L 33.0     CMP:  Recent Labs   Lab 06/22/22  0726 10/19/22  0733 03/29/23  0619   Glucose 70   < > 112 H   Calcium 8.0 L   < > 9.3   Albumin 3.7   < >  --    ALBUMIN  --   --  3.9   Total Protein 6.2   < > 5.8 L   Sodium 134 L   < > 132 L   Potassium 4.3   < > 4.4   CO2 25   < > 24   Chloride 100   < > 95 L   BUN 24   < > 41.0 H   Creatinine 1.22 H   < > 1.51 H   Alkaline Phosphatase  --   --  73   ALT 13   < > 53   AST 15   < > 46 H   Total Bilirubin 0.4   < > 0.4   eGFR if  53 L  --   --     < > = values in this interval not displayed.     LIPIDS:      TSH:      URINALYSIS:  Recent Labs   Lab Result Units 03/29/23 0619   Color, UA  YELLOW   Specific Gravity, UA  1.015   pH, UA  5.5   Protein, UA  NEGATIVE   Glucose, UA  NEGATIVE   Nitrite, UA  NEGATIVE   Leukocytes, UA  1+*        Lab Results   Component Value Date    WBC 4.8 03/29/2023    HGB 12.5 03/29/2023    HCT 37.7 03/29/2023     (L) 03/29/2023    K 4.4 03/29/2023    CL  95 (L) 03/29/2023    CO2 24 03/29/2023    BUN 41.0 (H) 03/29/2023    CREATININE 1.51 (H) 03/29/2023    EGFRNONAA 45 (L) 06/22/2022    CALCIUM 9.3 03/29/2023    PHOS 1.4 (L) 03/29/2023    MG 1.8 03/29/2023    ALBUMIN 3.9 03/29/2023    AST 46 (H) 03/29/2023    ALT 53 03/29/2023       No results found for: EXTANC, EXTWBC, EXTSEGS, EXTPLATELETS, EXTHEMOGLOBI, EXTHEMATOCRI, EXTCREATININ, EXTSODIUM, EXTPOTASSIUM, EXTBUN, EXTCO2, EXTCALCIUM, EXTPHOSPHORU, EXTGLUCOSE, EXTALBUMIN, EXTAST, EXTALT, EXTBILITOTAL, EXTLIPASE, EXTAMYLASE    No results found for: EXTCYCLOSLVL, EXTSIROLIMUS, EXTTACROLVL, EXTPROTCRE, EXTPTHINTACT, EXTPROTEINUA, EXTWBCUA, EXTRBCUA    Imaging Studies: n/a  ?    Assessment/Plan:  66 y.o. female with:     1- CKD 2/PAIGE - Renal function is worse with GFR of 77--> 37.  Unclear etiology, recent flu? She is non-oliguric. Avoid getting dehydrated. Denies taking NSAIDs, no new Rx.      2. DM 2 - Controlled with Lantus 16  A/P, not on humulog. BG much better controlled with A1c of 6.     4. Hyperlipidemia - On Atorvastatin 80 mg     5. Osteoporosis - On Prolia 2 X per year, just had a shot on October 20th.. Next shot will be April 18     6. Hypomagnesemia - Better @ 1.8.  Chance Mag may be OK.  Sts MagOx was making her have diarrhea.     7. Hypertension - Well controlled with medications     8. Hypothyroid - Increase L-Thyroxin to 88 mcg. TSH 0.42    9. Severe hypophosphatemia - uncertain why.  Rec milk, cheese, etc. Not taking Tums.     RTC 4 months     Antoni Aleman, DO  [facility]

## 2023-05-09 ENCOUNTER — PATIENT MESSAGE (OUTPATIENT)
Dept: INFECTIOUS DISEASES | Facility: CLINIC | Age: 68
End: 2023-05-09
Payer: MEDICARE

## 2023-07-24 ENCOUNTER — PATIENT MESSAGE (OUTPATIENT)
Dept: NEPHROLOGY | Facility: CLINIC | Age: 68
End: 2023-07-24
Payer: MEDICARE

## 2023-08-22 ENCOUNTER — OFFICE VISIT (OUTPATIENT)
Dept: NEPHROLOGY | Facility: CLINIC | Age: 68
End: 2023-08-22
Payer: MEDICARE

## 2023-08-22 VITALS
WEIGHT: 136.81 LBS | HEART RATE: 79 BPM | HEIGHT: 60 IN | SYSTOLIC BLOOD PRESSURE: 145 MMHG | DIASTOLIC BLOOD PRESSURE: 79 MMHG | BODY MASS INDEX: 26.86 KG/M2

## 2023-08-22 DIAGNOSIS — Z79.4 TYPE 2 DIABETES MELLITUS WITHOUT COMPLICATION, WITH LONG-TERM CURRENT USE OF INSULIN: ICD-10-CM

## 2023-08-22 DIAGNOSIS — I16.0 HYPERTENSIVE URGENCY: ICD-10-CM

## 2023-08-22 DIAGNOSIS — N18.2 CKD (CHRONIC KIDNEY DISEASE) STAGE 2, GFR 60-89 ML/MIN: Primary | ICD-10-CM

## 2023-08-22 DIAGNOSIS — E83.42 HYPOMAGNESEMIA: ICD-10-CM

## 2023-08-22 DIAGNOSIS — E11.9 TYPE 2 DIABETES MELLITUS WITHOUT COMPLICATION, WITH LONG-TERM CURRENT USE OF INSULIN: ICD-10-CM

## 2023-08-22 PROCEDURE — 1159F MED LIST DOCD IN RCRD: CPT | Mod: CPTII,S$GLB,, | Performed by: INTERNAL MEDICINE

## 2023-08-22 PROCEDURE — 1101F PR PT FALLS ASSESS DOC 0-1 FALLS W/OUT INJ PAST YR: ICD-10-PCS | Mod: CPTII,S$GLB,, | Performed by: INTERNAL MEDICINE

## 2023-08-22 PROCEDURE — 3044F PR MOST RECENT HEMOGLOBIN A1C LEVEL <7.0%: ICD-10-PCS | Mod: CPTII,S$GLB,, | Performed by: INTERNAL MEDICINE

## 2023-08-22 PROCEDURE — 99214 OFFICE O/P EST MOD 30 MIN: CPT | Mod: S$GLB,,, | Performed by: INTERNAL MEDICINE

## 2023-08-22 PROCEDURE — 3008F BODY MASS INDEX DOCD: CPT | Mod: CPTII,S$GLB,, | Performed by: INTERNAL MEDICINE

## 2023-08-22 PROCEDURE — 4010F PR ACE/ARB THEARPY RXD/TAKEN: ICD-10-PCS | Mod: CPTII,S$GLB,, | Performed by: INTERNAL MEDICINE

## 2023-08-22 PROCEDURE — 3078F DIAST BP <80 MM HG: CPT | Mod: CPTII,S$GLB,, | Performed by: INTERNAL MEDICINE

## 2023-08-22 PROCEDURE — 99999 PR PBB SHADOW E&M-EST. PATIENT-LVL II: CPT | Mod: PBBFAC,,, | Performed by: INTERNAL MEDICINE

## 2023-08-22 PROCEDURE — 1101F PT FALLS ASSESS-DOCD LE1/YR: CPT | Mod: CPTII,S$GLB,, | Performed by: INTERNAL MEDICINE

## 2023-08-22 PROCEDURE — 99999 PR PBB SHADOW E&M-EST. PATIENT-LVL II: ICD-10-PCS | Mod: PBBFAC,,, | Performed by: INTERNAL MEDICINE

## 2023-08-22 PROCEDURE — 3066F PR DOCUMENTATION OF TREATMENT FOR NEPHROPATHY: ICD-10-PCS | Mod: CPTII,S$GLB,, | Performed by: INTERNAL MEDICINE

## 2023-08-22 PROCEDURE — 1126F AMNT PAIN NOTED NONE PRSNT: CPT | Mod: CPTII,S$GLB,, | Performed by: INTERNAL MEDICINE

## 2023-08-22 PROCEDURE — 3288F PR FALLS RISK ASSESSMENT DOCUMENTED: ICD-10-PCS | Mod: CPTII,S$GLB,, | Performed by: INTERNAL MEDICINE

## 2023-08-22 PROCEDURE — 1126F PR PAIN SEVERITY QUANTIFIED, NO PAIN PRESENT: ICD-10-PCS | Mod: CPTII,S$GLB,, | Performed by: INTERNAL MEDICINE

## 2023-08-22 PROCEDURE — 1159F PR MEDICATION LIST DOCUMENTED IN MEDICAL RECORD: ICD-10-PCS | Mod: CPTII,S$GLB,, | Performed by: INTERNAL MEDICINE

## 2023-08-22 PROCEDURE — 3078F PR MOST RECENT DIASTOLIC BLOOD PRESSURE < 80 MM HG: ICD-10-PCS | Mod: CPTII,S$GLB,, | Performed by: INTERNAL MEDICINE

## 2023-08-22 PROCEDURE — 3077F PR MOST RECENT SYSTOLIC BLOOD PRESSURE >= 140 MM HG: ICD-10-PCS | Mod: CPTII,S$GLB,, | Performed by: INTERNAL MEDICINE

## 2023-08-22 PROCEDURE — 3008F PR BODY MASS INDEX (BMI) DOCUMENTED: ICD-10-PCS | Mod: CPTII,S$GLB,, | Performed by: INTERNAL MEDICINE

## 2023-08-22 PROCEDURE — 3288F FALL RISK ASSESSMENT DOCD: CPT | Mod: CPTII,S$GLB,, | Performed by: INTERNAL MEDICINE

## 2023-08-22 PROCEDURE — 99214 PR OFFICE/OUTPT VISIT, EST, LEVL IV, 30-39 MIN: ICD-10-PCS | Mod: S$GLB,,, | Performed by: INTERNAL MEDICINE

## 2023-08-22 PROCEDURE — 3044F HG A1C LEVEL LT 7.0%: CPT | Mod: CPTII,S$GLB,, | Performed by: INTERNAL MEDICINE

## 2023-08-22 PROCEDURE — 3077F SYST BP >= 140 MM HG: CPT | Mod: CPTII,S$GLB,, | Performed by: INTERNAL MEDICINE

## 2023-08-22 PROCEDURE — 4010F ACE/ARB THERAPY RXD/TAKEN: CPT | Mod: CPTII,S$GLB,, | Performed by: INTERNAL MEDICINE

## 2023-08-22 PROCEDURE — 3066F NEPHROPATHY DOC TX: CPT | Mod: CPTII,S$GLB,, | Performed by: INTERNAL MEDICINE
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
## 2024-02-23 ENCOUNTER — TELEPHONE (OUTPATIENT)
Dept: NEPHROLOGY | Facility: CLINIC | Age: 69
End: 2024-02-23

## 2024-02-23 NOTE — TELEPHONE ENCOUNTER
Spoke with pt. Informed pt Dr. Aleman is no longer going to be seeing pts. Therefore the pt will have to contact her PCP to get another referral for a nephrologist. Verbal Understanding.

## 2024-12-17 NOTE — CONSULTS
[facility]PAT[facility] CONSULTATION     Attending Cardiologist: Dr. Lai  Cardiology Fellow: Dr. Felix  Physician Requesting Consultation: Dr. Kilpatrick  Chief Complaint/Reason For Consultation:  Elevated Troponin/Hypertensive Urgency     HPI: Karen Leone is a 64 y/o F with a PMH significant for HTN, HLD, DM Type II, and Diverticulosis who presents to [facility] after experiencing a mechanical fall while at work. 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 Ms. Leone states that on the morning of admission she was working a case and first felt R shoulder tingling and twitching which then progressed to difficulty with moving her R upper extremity. This feeling than spread to her L shoulder however her symptoms quickly resolved. She further states she was finishing the case when this same feeling occurred in her legs bilaterally. She states she went to take a step and couldn't move her leg and than subsequently fell to the ground. During this time, she denies LOC, dizziness, chest pain, heart palpitations, or SOB. She does take multiple BP medications and states she had not taken them prior to this event which is not unusual for her to do.      She states she has never experienced any prior episodes like this before and denies any seizure activity in the past. At baseline, she performs all her ADL's and can exert herself without restriction.      Past Medical History  HTN  HLD  DM Type II  Diverticulosis     Surgical History  Tonsillectomy  Eye procedure, bilaterally  L knee tendon repair     Family History  Mother - Diabetes     Social History  Tobacco - former, 20 pack-year history   EtOH - current, 3 cocktails/night  Drugs - denies     Allergies  Keflex - hives, denies angioedema   Metformin - diarrhea     Home Medications  Lisinopril 60mg qD   HCTZ 25  Norvasc 5  Metoprolol XL 50  Basaglar 28U qHS   Glipzide ER 20  Protonix 40  Acyclovir 200mg x5 days qprn  Tramadol 50mg q6h prn   Lamotil  1 tab tid  ofran 4mg bid   Clacium 500mg   Vit D 50K units qweek  Lipitor 80 qHS   Synthroid 50   Fenofibrate 150   Claritin 10mg      Review of Systems  Review of Systems - Negative except as noted above.      OBJECTIVE     Physical Exam  General: Resting comfortably in no acute distress  Head: Normocephalic/bruising noted over chin  Neck: Jugular venous pressure within normal limits, no carotid bruits auscultated   Lungs: Chest clear to auscultation bilaterally, no wheezes, no rales   Chest wall: No chest wall tenderness  Heart: Regular rate and rhythm, no murmurs/rubs/gallops  Abdomen: soft, non-tender, non-distended  Extremities: no cyanosis, clubbing or edema   Skin: warm, dry, no rashes      Lab Results  Results for ADAN CASIANO (MRN [id number]) as of 2/15/2019 08:43    Ref. Range 2/14/2019 11:44   WBC Latest Ref Range: 3.90 - 12.70 K/uL 8.18   RBC Latest Ref Range: 4.00 - 5.40 M/uL 4.36   Hemoglobin Latest Ref Range: 12.0 - 16.0 g/dL 12.1   Hematocrit Latest Ref Range: 37.0 - 48.5 % 37.9   MCV Latest Ref Range: 82 - 98 fL 87   MCH Latest Ref Range: 27.0 - 31.0 pg 27.8   MCHC Latest Ref Range: 32.0 - 36.0 g/dL 31.9 (L)   RDW Latest Ref Range: 11.5 - 14.5 % 14.4   Platelets Latest Ref Range: 150 - 350 K/uL 310      Results for ADAN CASIANO (MRN [id number]) as of 2/15/2019 08:55    Ref. Range 2/15/2019 03:46   Sodium Latest Ref Range: 136 - 145 mmol/L 138   Potassium Latest Ref Range: 3.5 - 5.1 mmol/L 3.5   Chloride Latest Ref Range: 95 - 110 mmol/L 99   CO2 Latest Ref Range: 23 - 29 mmol/L 26   Anion Gap Latest Ref Range: 8 - 16 mmol/L 13   BUN, Bld Latest Ref Range: 8 - 23 mg/dL 28 (H)   Creatinine Latest Ref Range: 0.5 - 1.4 mg/dL 1.2   eGFR if non African American Latest Ref Range: >60 mL/min/1.73 m^2 48 (A)   eGFR if African American Latest Ref Range: >60 mL/min/1.73 m^2 55 (A)   Glucose Latest Ref Range: 70 - 110 mg/dL 78   Calcium Latest Ref Range: 8.7 - 10.5 mg/dL 8.6 (L)   Alkaline Phosphatase Latest Ref  Range: 55 - 135 U/L 53 (L)   Total Protein Latest Ref Range: 6.0 - 8.4 g/dL 6.8   Albumin Latest Ref Range: 3.5 - 5.2 g/dL 3.3 (L)   Total Bilirubin Latest Ref Range: 0.1 - 1.0 mg/dL 0.5   AST Latest Ref Range: 10 - 40 U/L 20   ALT Latest Ref Range: 10 - 44 U/L 11       Results for ADAN CASIANO (MRN [id number]) as of 2/15/2019 08:55    Ref. Range 2019 11:44 2019 20:34 2019 23:48   Troponin I Latest Ref Range: 0.000 - 0.026 ng/mL 0.051 (H) 0.087 (H) 0.066 (H)         Cardiovascular Studies     EC2019 NSR, Normal ECG     ECHO:   2019  · Normal left ventricular systolic function. The estimated ejection fraction is 60%  · Grade II (moderate) left ventricular diastolic dysfunction consistent with pseudonormalization.  · Elevated left atrial pressure.  · Mild left atrial enlargement.  · Normal right ventricular systolic function.  · Normal central venous pressure (3 mm Hg).     Increased left heart chamber filling pressures with normal LV systolic function.         ASSESSMENT/PLAN     Elevated Troponin  -BP upon admission with systolic in ~200's  -Troponin noted to be mildly elevated during this admission, 0.051->0.087->0.066  -Patient denies chest pain, palpitations, SOB  -ECHO results as above, no wall motion abnormalities appreciated  -Elevated troponin's likely 2/2 to uncontrolled HTN  -Recommend follow up with cardiology as outpatient upon discharge.   -No further work up needed at this time as inpatient.      HTN  -Uncontrolled upon admission  -Currently on Amlodipine 10mg qDm, HCTZ 25mg qD, Toprol XL 50mg qD, Lisinopril 40mg qD  -BP's improved this AM, started on hydralazine 10mg TID overnight  -Given patient on multiple antihypertensives, recommend renal US w/ doppler to assess for renal vascular disease. Will follow up results.     Case discussed with attending and fellow. Please call [phone] with any questions. Thank you for this consult.     Malcom Sims,  [facility]  Cardiology Service         No